# Patient Record
Sex: FEMALE | Race: BLACK OR AFRICAN AMERICAN | Employment: OTHER | URBAN - METROPOLITAN AREA
[De-identification: names, ages, dates, MRNs, and addresses within clinical notes are randomized per-mention and may not be internally consistent; named-entity substitution may affect disease eponyms.]

---

## 2017-03-28 ENCOUNTER — HOSPITAL ENCOUNTER (OUTPATIENT)
Dept: OTHER | Age: 82
Discharge: HOME | End: 2017-03-28

## 2017-03-28 LAB
ALBUMIN SERPL-MCNC: 3.3 GM/DL (ref 3.4–5)
ALP BLD-CCNC: 144 IU/L (ref 40–128)
ALT SERPL-CCNC: 25 U/L (ref 10–40)
ANION GAP SERPL CALCULATED.3IONS-SCNC: 7 MMOL/L (ref 4–16)
AST SERPL-CCNC: 22 IU/L (ref 15–37)
BACTERIA: ABNORMAL /HPF
BASOPHILS ABSOLUTE: 0 K/CU MM
BASOPHILS RELATIVE PERCENT: 0.6 % (ref 0–1)
BILIRUB SERPL-MCNC: 0.2 MG/DL (ref 0–1)
BILIRUBIN URINE: NEGATIVE MG/DL
BLOOD, URINE: NEGATIVE
BUN BLDV-MCNC: 36 MG/DL (ref 6–23)
CALCIUM SERPL-MCNC: 10.6 MG/DL (ref 8.3–10.6)
CHLORIDE BLD-SCNC: 102 MMOL/L (ref 99–110)
CHOLESTEROL: 167 MG/DL
CLARITY: ABNORMAL
CO2: 27 MMOL/L (ref 21–32)
COLOR: ABNORMAL
CREAT SERPL-MCNC: 1.3 MG/DL (ref 0.6–1.1)
CREATININE URINE: 53.4 MG/DL (ref 28–217)
DIFFERENTIAL TYPE: ABNORMAL
EOSINOPHILS ABSOLUTE: 0.2 K/CU MM
EOSINOPHILS RELATIVE PERCENT: 3.1 % (ref 0–3)
ESTIMATED AVERAGE GLUCOSE: 243 MG/DL
GFR AFRICAN AMERICAN: 48 ML/MIN/1.73M2
GFR NON-AFRICAN AMERICAN: 40 ML/MIN/1.73M2
GLUCOSE BLD-MCNC: 170 MG/DL (ref 70–140)
GLUCOSE, URINE: 150 MG/DL
HBA1C MFR BLD: 10.1 % (ref 4.2–6.3)
HCT VFR BLD CALC: 34.7 % (ref 37–47)
HDLC SERPL-MCNC: 89 MG/DL
HEMOGLOBIN: 10.7 GM/DL (ref 12.5–16)
IMMATURE NEUTROPHIL %: 0.2 % (ref 0–0.43)
KETONES, URINE: NEGATIVE MG/DL
LDL CHOLESTEROL DIRECT: 72 MG/DL
LEUKOCYTE ESTERASE, URINE: ABNORMAL
LYMPHOCYTES ABSOLUTE: 0.8 K/CU MM
LYMPHOCYTES RELATIVE PERCENT: 15 % (ref 24–44)
MCH RBC QN AUTO: 29.7 PG (ref 27–31)
MCHC RBC AUTO-ENTMCNC: 30.8 % (ref 32–36)
MCV RBC AUTO: 96.4 FL (ref 78–100)
MICROALBUMIN/CREAT 24H UR: 5.4 MG/DL
MICROALBUMIN/CREAT UR-RTO: 101.1 MG/G CREAT (ref 0–30)
MONOCYTES ABSOLUTE: 0.3 K/CU MM
MONOCYTES RELATIVE PERCENT: 5.7 % (ref 0–4)
MUCUS: ABNORMAL HPF
NITRITE URINE, QUANTITATIVE: NEGATIVE
NUCLEATED RBC %: 0 %
PDW BLD-RTO: 14.2 % (ref 11.7–14.9)
PH, URINE: 5 (ref 5–8)
PLATELET # BLD: 136 K/CU MM (ref 140–440)
PMV BLD AUTO: 11.6 FL (ref 7.5–11.1)
POTASSIUM SERPL-SCNC: 5.5 MMOL/L (ref 3.5–5.1)
PROTEIN UA: NEGATIVE MG/DL
RBC # BLD: 3.6 M/CU MM (ref 4.2–5.4)
RBC URINE: ABNORMAL /HPF (ref 0–6)
SEGMENTED NEUTROPHILS ABSOLUTE COUNT: 4.1 K/CU MM
SEGMENTED NEUTROPHILS RELATIVE PERCENT: 75.4 % (ref 36–66)
SODIUM BLD-SCNC: 136 MMOL/L (ref 135–145)
SPECIFIC GRAVITY UA: 1.01 (ref 1–1.03)
SQUAMOUS EPITHELIAL: 6 /HPF
TOTAL CK: 83 IU/L (ref 26–140)
TOTAL IMMATURE NEUTOROPHIL: 0.01 K/CU MM
TOTAL NUCLEATED RBC: 0 K/CU MM
TOTAL PROTEIN: 5.8 GM/DL (ref 6.4–8.2)
TRIGL SERPL-MCNC: 89 MG/DL
UROBILINOGEN, URINE: NORMAL EU/DL (ref 0.2–1)
WBC # BLD: 5.4 K/CU MM (ref 4–10.5)
WBC CLUMP: ABNORMAL /HPF
WBC UA: 79 /HPF (ref 0–5)

## 2017-03-30 LAB
CULTURE: NORMAL
ORGANISM: NORMAL
REPORT STATUS: NORMAL
REQUEST PROBLEM: NORMAL
SPECIMEN: NORMAL
TOTAL COLONY COUNT: NORMAL

## 2017-05-24 ENCOUNTER — HOSPITAL ENCOUNTER (OUTPATIENT)
Dept: OTHER | Age: 82
Discharge: HOME | End: 2017-05-24
Attending: INTERNAL MEDICINE | Admitting: INTERNAL MEDICINE

## 2017-05-24 LAB
ALBUMIN SERPL-MCNC: 3.9 GM/DL (ref 3.4–5)
ALP BLD-CCNC: 118 IU/L (ref 40–129)
ALT SERPL-CCNC: 14 U/L (ref 10–40)
ANION GAP SERPL CALCULATED.3IONS-SCNC: 12 MMOL/L (ref 4–16)
AST SERPL-CCNC: 20 IU/L (ref 15–37)
BILIRUB SERPL-MCNC: 0.2 MG/DL (ref 0–1)
BUN BLDV-MCNC: 35 MG/DL (ref 6–23)
CALCIUM SERPL-MCNC: 10.4 MG/DL (ref 8.3–10.6)
CHLORIDE BLD-SCNC: 109 MMOL/L (ref 99–110)
CO2: 22 MMOL/L (ref 21–32)
CREAT SERPL-MCNC: 1.3 MG/DL (ref 0.6–1.1)
GFR AFRICAN AMERICAN: 48 ML/MIN/1.73M2
GFR NON-AFRICAN AMERICAN: 40 ML/MIN/1.73M2
GLUCOSE BLD-MCNC: 70 MG/DL (ref 70–140)
LACTATE DEHYDROGENASE: 214 IU/L (ref 120–246)
POTASSIUM SERPL-SCNC: 5.9 MMOL/L (ref 3.5–5.1)
SODIUM BLD-SCNC: 143 MMOL/L (ref 135–145)
TOTAL PROTEIN: 6 GM/DL (ref 6.4–8.2)

## 2017-06-09 ENCOUNTER — HOSPITAL ENCOUNTER (OUTPATIENT)
Dept: OTHER | Age: 82
Discharge: HOME | End: 2017-06-09
Attending: INTERNAL MEDICINE | Admitting: INTERNAL MEDICINE

## 2017-06-09 LAB
ALBUMIN SERPL-MCNC: 4 GM/DL (ref 3.4–5)
ALP BLD-CCNC: 127 IU/L (ref 40–128)
ALT SERPL-CCNC: 17 U/L (ref 10–40)
ANION GAP SERPL CALCULATED.3IONS-SCNC: 11 MMOL/L (ref 4–16)
AST SERPL-CCNC: 19 IU/L (ref 15–37)
BANDED NEUTROPHILS ABSOLUTE COUNT: 0.24 K/CU MM
BANDED NEUTROPHILS RELATIVE PERCENT: 4 % (ref 5–11)
BILIRUB SERPL-MCNC: 0.2 MG/DL (ref 0–1)
BUN BLDV-MCNC: 41 MG/DL (ref 6–23)
CALCIUM SERPL-MCNC: 10.7 MG/DL (ref 8.3–10.6)
CHLORIDE BLD-SCNC: 107 MMOL/L (ref 99–110)
CHOLESTEROL: 166 MG/DL
CO2: 22 MMOL/L (ref 21–32)
CREAT SERPL-MCNC: 1.2 MG/DL (ref 0.6–1.1)
CREATININE URINE: 54.1 MG/DL (ref 28–217)
DIFFERENTIAL TYPE: ABNORMAL
EOSINOPHILS ABSOLUTE: 0.1 K/CU MM
EOSINOPHILS RELATIVE PERCENT: 2 % (ref 0–3)
ESTIMATED AVERAGE GLUCOSE: 180 MG/DL
GFR AFRICAN AMERICAN: 52 ML/MIN/1.73M2
GFR NON-AFRICAN AMERICAN: 43 ML/MIN/1.73M2
GLUCOSE BLD-MCNC: 165 MG/DL (ref 70–140)
HBA1C MFR BLD: 7.9 % (ref 4.2–6.3)
HCT VFR BLD CALC: 32.4 % (ref 37–47)
HDLC SERPL-MCNC: 104 MG/DL
HEMOGLOBIN: 10.4 GM/DL (ref 12.5–16)
LDL CHOLESTEROL DIRECT: 54 MG/DL
LYMPHOCYTES ABSOLUTE: 0.9 K/CU MM
LYMPHOCYTES RELATIVE PERCENT: 16 % (ref 24–44)
MCH RBC QN AUTO: 31 PG (ref 27–31)
MCHC RBC AUTO-ENTMCNC: 32.1 % (ref 32–36)
MCV RBC AUTO: 96.4 FL (ref 78–100)
MICROALBUMIN/CREAT 24H UR: 18.4 MG/DL
MICROALBUMIN/CREAT UR-RTO: 340.1 MG/G CREAT (ref 0–30)
MONOCYTES ABSOLUTE: 0.4 K/CU MM
MONOCYTES RELATIVE PERCENT: 6 % (ref 0–4)
PDW BLD-RTO: 15 % (ref 11.7–14.9)
PLATELET # BLD: 164 K/CU MM (ref 140–440)
PMV BLD AUTO: 11.1 FL (ref 7.5–11.1)
POTASSIUM SERPL-SCNC: 5.2 MMOL/L (ref 3.5–5.1)
RBC # BLD: 3.36 M/CU MM (ref 4.2–5.4)
RBC # BLD: SLIGHT 10*6/UL
SEGMENTED NEUTROPHILS ABSOLUTE COUNT: 4.3 K/CU MM
SEGMENTED NEUTROPHILS RELATIVE PERCENT: 72 % (ref 36–66)
SODIUM BLD-SCNC: 140 MMOL/L (ref 135–145)
TOTAL CK: 100 IU/L (ref 26–140)
TOTAL PROTEIN: 6.7 GM/DL (ref 6.4–8.2)
TRIGL SERPL-MCNC: 67 MG/DL
URIC ACID: 3.6 MG/DL (ref 2.6–6)
WBC # BLD: 5.9 K/CU MM (ref 4–10.5)

## 2018-01-01 ENCOUNTER — HOSPITAL ENCOUNTER (OUTPATIENT)
Age: 83
Setting detail: SPECIMEN
Discharge: HOME | End: 2018-09-28

## 2018-01-01 ENCOUNTER — APPOINTMENT (OUTPATIENT)
Dept: CT IMAGING | Age: 83
DRG: 689 | End: 2018-01-01
Payer: COMMERCIAL

## 2018-01-01 ENCOUNTER — HOSPITAL ENCOUNTER (OUTPATIENT)
Dept: GENERAL RADIOLOGY | Age: 83
Discharge: HOME | End: 2018-05-18

## 2018-01-01 ENCOUNTER — APPOINTMENT (OUTPATIENT)
Dept: GENERAL RADIOLOGY | Age: 83
DRG: 689 | End: 2018-01-01
Payer: COMMERCIAL

## 2018-01-01 ENCOUNTER — HOSPITAL ENCOUNTER (OUTPATIENT)
Dept: OTHER | Age: 83
Discharge: HOME | End: 2018-09-04
Attending: INTERNAL MEDICINE | Admitting: INTERNAL MEDICINE

## 2018-01-01 ENCOUNTER — HOSPITAL ENCOUNTER (OUTPATIENT)
Age: 83
Discharge: HOME | End: 2018-09-25

## 2018-01-01 ENCOUNTER — HOSPITAL ENCOUNTER (OUTPATIENT)
Age: 83
Setting detail: SPECIMEN
Discharge: HOME | End: 2018-11-28
Payer: COMMERCIAL

## 2018-01-01 ENCOUNTER — HOSPITAL ENCOUNTER (INPATIENT)
Age: 83
LOS: 3 days | Discharge: HOME HEALTH | DRG: 689 | End: 2018-10-18
Attending: EMERGENCY MEDICINE | Admitting: INTERNAL MEDICINE
Payer: COMMERCIAL

## 2018-01-01 VITALS
WEIGHT: 128.5 LBS | TEMPERATURE: 97.9 F | DIASTOLIC BLOOD PRESSURE: 63 MMHG | HEIGHT: 63 IN | RESPIRATION RATE: 14 BRPM | HEART RATE: 81 BPM | BODY MASS INDEX: 22.77 KG/M2 | OXYGEN SATURATION: 100 % | SYSTOLIC BLOOD PRESSURE: 112 MMHG

## 2018-01-01 DIAGNOSIS — E87.5 HYPERKALEMIA: ICD-10-CM

## 2018-01-01 DIAGNOSIS — M79.672 PAIN IN LEFT FOOT: ICD-10-CM

## 2018-01-01 DIAGNOSIS — R41.82 ALTERED MENTAL STATUS, UNSPECIFIED ALTERED MENTAL STATUS TYPE: Primary | ICD-10-CM

## 2018-01-01 LAB
ALBUMIN SERPL-MCNC: 3.7 GM/DL (ref 3.4–5)
ALP BLD-CCNC: 145 IU/L (ref 40–129)
ALT SERPL-CCNC: 18 U/L (ref 10–40)
ANION GAP SERPL CALCULATED.3IONS-SCNC: 10 MMOL/L (ref 4–16)
ANION GAP SERPL CALCULATED.3IONS-SCNC: 13 MMOL/L (ref 4–16)
ANION GAP SERPL CALCULATED.3IONS-SCNC: 14 MMOL/L (ref 4–16)
ANION GAP SERPL CALCULATED.3IONS-SCNC: 6 MMOL/L (ref 4–16)
ANION GAP SERPL CALCULATED.3IONS-SCNC: 6 MMOL/L (ref 4–16)
ANION GAP SERPL CALCULATED.3IONS-SCNC: 7 MMOL/L (ref 4–16)
ANION GAP SERPL CALCULATED.3IONS-SCNC: 8 MMOL/L (ref 4–16)
ANION GAP SERPL CALCULATED.3IONS-SCNC: 8 MMOL/L (ref 4–16)
ANION GAP SERPL CALCULATED.3IONS-SCNC: 9 MMOL/L (ref 4–16)
ANION GAP SERPL CALCULATED.3IONS-SCNC: 9 MMOL/L (ref 4–16)
APTT: 26.7 SECONDS (ref 21.2–33)
AST SERPL-CCNC: 23 IU/L (ref 15–37)
BACTERIA: ABNORMAL /HPF
BACTERIA: NEGATIVE /HPF
BASOPHILS ABSOLUTE: 0 K/CU MM
BASOPHILS ABSOLUTE: 0.1 K/CU MM
BASOPHILS RELATIVE PERCENT: 0.4 % (ref 0–1)
BASOPHILS RELATIVE PERCENT: 0.6 % (ref 0–1)
BILIRUB SERPL-MCNC: 0.2 MG/DL (ref 0–1)
BILIRUBIN URINE: NEGATIVE MG/DL
BILIRUBIN URINE: NEGATIVE MG/DL
BLOOD, URINE: NEGATIVE
BLOOD, URINE: NEGATIVE
BUN BLDV-MCNC: 27 MG/DL (ref 6–23)
BUN BLDV-MCNC: 29 MG/DL (ref 6–23)
BUN BLDV-MCNC: 29 MG/DL (ref 6–23)
BUN BLDV-MCNC: 31 MG/DL (ref 6–23)
BUN BLDV-MCNC: 32 MG/DL (ref 6–23)
BUN BLDV-MCNC: 33 MG/DL (ref 6–23)
BUN BLDV-MCNC: 34 MG/DL (ref 6–23)
BUN BLDV-MCNC: 36 MG/DL (ref 6–23)
BUN BLDV-MCNC: 38 MG/DL (ref 6–23)
BUN BLDV-MCNC: 40 MG/DL (ref 6–23)
CALCIUM SERPL-MCNC: 10 MG/DL (ref 8.3–10.6)
CALCIUM SERPL-MCNC: 10.3 MG/DL (ref 8.3–10.6)
CALCIUM SERPL-MCNC: 10.5 MG/DL (ref 8.3–10.6)
CALCIUM SERPL-MCNC: 10.6 MG/DL (ref 8.3–10.6)
CALCIUM SERPL-MCNC: 9.3 MG/DL (ref 8.3–10.6)
CALCIUM SERPL-MCNC: 9.3 MG/DL (ref 8.3–10.6)
CALCIUM SERPL-MCNC: 9.6 MG/DL (ref 8.3–10.6)
CALCIUM SERPL-MCNC: 9.7 MG/DL (ref 8.3–10.6)
CALCIUM SERPL-MCNC: 9.7 MG/DL (ref 8.3–10.6)
CHLORIDE BLD-SCNC: 106 MMOL/L (ref 99–110)
CHLORIDE BLD-SCNC: 107 MMOL/L (ref 99–110)
CHLORIDE BLD-SCNC: 108 MMOL/L (ref 99–110)
CHLORIDE BLD-SCNC: 109 MMOL/L (ref 99–110)
CHLORIDE BLD-SCNC: 109 MMOL/L (ref 99–110)
CHLORIDE BLD-SCNC: 110 MMOL/L (ref 99–110)
CHLORIDE BLD-SCNC: 110 MMOL/L (ref 99–110)
CHLORIDE BLD-SCNC: 113 MMOL/L (ref 99–110)
CHLORIDE BLD-SCNC: 115 MMOL/L (ref 99–110)
CHLORIDE BLD-SCNC: 116 MMOL/L (ref 99–110)
CLARITY: ABNORMAL
CLARITY: CLEAR
CO2: 18 MMOL/L (ref 21–32)
CO2: 20 MMOL/L (ref 21–32)
CO2: 21 MMOL/L (ref 21–32)
CO2: 21 MMOL/L (ref 21–32)
CO2: 22 MMOL/L (ref 21–32)
CO2: 22 MMOL/L (ref 21–32)
CO2: 23 MMOL/L (ref 21–32)
CO2: 23 MMOL/L (ref 21–32)
CO2: 24 MMOL/L (ref 21–32)
CO2: 24 MMOL/L (ref 21–32)
COLOR: YELLOW
COLOR: YELLOW
CREAT SERPL-MCNC: 1.4 MG/DL (ref 0.6–1.1)
CREAT SERPL-MCNC: 1.5 MG/DL (ref 0.6–1.1)
CREAT SERPL-MCNC: 1.5 MG/DL (ref 0.6–1.1)
CREAT SERPL-MCNC: 1.6 MG/DL (ref 0.6–1.1)
CREAT SERPL-MCNC: 1.6 MG/DL (ref 0.6–1.1)
CREAT SERPL-MCNC: 1.7 MG/DL (ref 0.6–1.1)
CREAT SERPL-MCNC: 1.7 MG/DL (ref 0.6–1.1)
CREAT SERPL-MCNC: 1.8 MG/DL (ref 0.6–1.1)
CREATININE URINE: 55.3 MG/DL (ref 28–217)
CULTURE: NORMAL
DIFFERENTIAL TYPE: ABNORMAL
DIFFERENTIAL TYPE: ABNORMAL
EKG ATRIAL RATE: 87 BPM
EKG DIAGNOSIS: NORMAL
EKG P AXIS: 44 DEGREES
EKG P-R INTERVAL: 178 MS
EKG Q-T INTERVAL: 392 MS
EKG QRS DURATION: 134 MS
EKG QTC CALCULATION (BAZETT): 471 MS
EKG R AXIS: -64 DEGREES
EKG T AXIS: 44 DEGREES
EKG VENTRICULAR RATE: 87 BPM
EOSINOPHILS ABSOLUTE: 0 K/CU MM
EOSINOPHILS ABSOLUTE: 0.1 K/CU MM
EOSINOPHILS RELATIVE PERCENT: 0.1 % (ref 0–3)
EOSINOPHILS RELATIVE PERCENT: 1.7 % (ref 0–3)
ESTIMATED AVERAGE GLUCOSE: 166 MG/DL
GFR AFRICAN AMERICAN: 33 ML/MIN/1.73M2
GFR AFRICAN AMERICAN: 35 ML/MIN/1.73M2
GFR AFRICAN AMERICAN: 35 ML/MIN/1.73M2
GFR AFRICAN AMERICAN: 38 ML/MIN/1.73M2
GFR AFRICAN AMERICAN: 38 ML/MIN/1.73M2
GFR AFRICAN AMERICAN: 40 ML/MIN/1.73M2
GFR AFRICAN AMERICAN: 40 ML/MIN/1.73M2
GFR AFRICAN AMERICAN: 44 ML/MIN/1.73M2
GFR NON-AFRICAN AMERICAN: 27 ML/MIN/1.73M2
GFR NON-AFRICAN AMERICAN: 29 ML/MIN/1.73M2
GFR NON-AFRICAN AMERICAN: 29 ML/MIN/1.73M2
GFR NON-AFRICAN AMERICAN: 31 ML/MIN/1.73M2
GFR NON-AFRICAN AMERICAN: 31 ML/MIN/1.73M2
GFR NON-AFRICAN AMERICAN: 33 ML/MIN/1.73M2
GFR NON-AFRICAN AMERICAN: 33 ML/MIN/1.73M2
GFR NON-AFRICAN AMERICAN: 36 ML/MIN/1.73M2
GLUCOSE BLD-MCNC: 113 MG/DL (ref 70–99)
GLUCOSE BLD-MCNC: 117 MG/DL (ref 70–99)
GLUCOSE BLD-MCNC: 119 MG/DL (ref 70–99)
GLUCOSE BLD-MCNC: 127 MG/DL
GLUCOSE BLD-MCNC: 127 MG/DL (ref 70–99)
GLUCOSE BLD-MCNC: 130 MG/DL (ref 70–99)
GLUCOSE BLD-MCNC: 134 MG/DL (ref 70–99)
GLUCOSE BLD-MCNC: 148 MG/DL (ref 70–99)
GLUCOSE BLD-MCNC: 152 MG/DL (ref 70–99)
GLUCOSE BLD-MCNC: 154 MG/DL (ref 70–99)
GLUCOSE BLD-MCNC: 159 MG/DL (ref 70–99)
GLUCOSE BLD-MCNC: 178 MG/DL (ref 70–99)
GLUCOSE BLD-MCNC: 180 MG/DL (ref 70–99)
GLUCOSE BLD-MCNC: 189 MG/DL (ref 70–99)
GLUCOSE BLD-MCNC: 198 MG/DL (ref 70–99)
GLUCOSE BLD-MCNC: 215 MG/DL (ref 70–99)
GLUCOSE BLD-MCNC: 219 MG/DL (ref 70–99)
GLUCOSE BLD-MCNC: 224 MG/DL (ref 70–99)
GLUCOSE BLD-MCNC: 225 MG/DL (ref 70–99)
GLUCOSE BLD-MCNC: 233 MG/DL (ref 70–99)
GLUCOSE BLD-MCNC: 238 MG/DL (ref 70–99)
GLUCOSE BLD-MCNC: 277 MG/DL (ref 70–99)
GLUCOSE BLD-MCNC: 289 MG/DL (ref 70–99)
GLUCOSE BLD-MCNC: 325 MG/DL (ref 70–99)
GLUCOSE BLD-MCNC: 90 MG/DL (ref 70–99)
GLUCOSE, URINE: 50 MG/DL
GLUCOSE, URINE: 50 MG/DL
HBA1C MFR BLD: 7.4 % (ref 4.2–6.3)
HCT VFR BLD CALC: 31.8 % (ref 37–47)
HCT VFR BLD CALC: 31.9 % (ref 37–47)
HCT VFR BLD CALC: 32.9 % (ref 37–47)
HCT VFR BLD CALC: 33.9 % (ref 37–47)
HCT VFR BLD CALC: 35.1 % (ref 37–47)
HCT VFR BLD CALC: 37 % (ref 37–47)
HEMOGLOBIN: 10.2 GM/DL (ref 12.5–16)
HEMOGLOBIN: 10.8 GM/DL (ref 12.5–16)
HEMOGLOBIN: 10.8 GM/DL (ref 12.5–16)
HEMOGLOBIN: 11 GM/DL (ref 12.5–16)
HEMOGLOBIN: 9.5 GM/DL (ref 12.5–16)
HEMOGLOBIN: 9.5 GM/DL (ref 12.5–16)
HYALINE CASTS: 2 /LPF
IMMATURE NEUTROPHIL %: 0.2 % (ref 0–0.43)
IMMATURE NEUTROPHIL %: 0.2 % (ref 0–0.43)
INR BLD: 0.88 INDEX
KETONES, URINE: NEGATIVE MG/DL
KETONES, URINE: NEGATIVE MG/DL
LEUKOCYTE ESTERASE, URINE: ABNORMAL
LEUKOCYTE ESTERASE, URINE: ABNORMAL
LYMPHOCYTES ABSOLUTE: 0.7 K/CU MM
LYMPHOCYTES ABSOLUTE: 1.1 K/CU MM
LYMPHOCYTES RELATIVE PERCENT: 13.3 % (ref 24–44)
LYMPHOCYTES RELATIVE PERCENT: 8.4 % (ref 24–44)
Lab: NORMAL
MCH RBC QN AUTO: 29.1 PG (ref 27–31)
MCH RBC QN AUTO: 29.5 PG (ref 27–31)
MCH RBC QN AUTO: 29.6 PG (ref 27–31)
MCH RBC QN AUTO: 29.8 PG (ref 27–31)
MCH RBC QN AUTO: 29.9 PG (ref 27–31)
MCH RBC QN AUTO: 30.3 PG (ref 27–31)
MCHC RBC AUTO-ENTMCNC: 29.7 % (ref 32–36)
MCHC RBC AUTO-ENTMCNC: 29.8 % (ref 32–36)
MCHC RBC AUTO-ENTMCNC: 29.9 % (ref 32–36)
MCHC RBC AUTO-ENTMCNC: 30.8 % (ref 32–36)
MCHC RBC AUTO-ENTMCNC: 31 % (ref 32–36)
MCHC RBC AUTO-ENTMCNC: 31.9 % (ref 32–36)
MCV RBC AUTO: 100.3 FL (ref 78–100)
MCV RBC AUTO: 95 FL (ref 78–100)
MCV RBC AUTO: 95.4 FL (ref 78–100)
MCV RBC AUTO: 97.2 FL (ref 78–100)
MCV RBC AUTO: 97.6 FL (ref 78–100)
MCV RBC AUTO: 98.8 FL (ref 78–100)
MONOCYTES ABSOLUTE: 0.3 K/CU MM
MONOCYTES ABSOLUTE: 0.5 K/CU MM
MONOCYTES RELATIVE PERCENT: 3.5 % (ref 0–4)
MONOCYTES RELATIVE PERCENT: 6.7 % (ref 0–4)
MUCUS: ABNORMAL HPF
NITRITE URINE, QUANTITATIVE: NEGATIVE
NITRITE URINE, QUANTITATIVE: POSITIVE
NUCLEATED RBC %: 0 %
NUCLEATED RBC %: 0 %
ORGANISM: NORMAL
PDW BLD-RTO: 13.6 % (ref 11.7–14.9)
PDW BLD-RTO: 13.7 % (ref 11.7–14.9)
PDW BLD-RTO: 13.7 % (ref 11.7–14.9)
PDW BLD-RTO: 13.8 % (ref 11.7–14.9)
PDW BLD-RTO: 14.3 % (ref 11.7–14.9)
PDW BLD-RTO: 14.4 % (ref 11.7–14.9)
PH, URINE: 5 (ref 5–8)
PH, URINE: 6 (ref 5–8)
PHOSPHORUS: 3 MG/DL (ref 2.5–4.9)
PLATELET # BLD: 177 K/CU MM (ref 140–440)
PLATELET # BLD: 188 K/CU MM (ref 140–440)
PLATELET # BLD: 195 K/CU MM (ref 140–440)
PLATELET # BLD: 202 K/CU MM (ref 140–440)
PLATELET # BLD: 224 K/CU MM (ref 140–440)
PLATELET # BLD: 234 K/CU MM (ref 140–440)
PMV BLD AUTO: 10.2 FL (ref 7.5–11.1)
PMV BLD AUTO: 10.3 FL (ref 7.5–11.1)
PMV BLD AUTO: 10.4 FL (ref 7.5–11.1)
PMV BLD AUTO: 10.6 FL (ref 7.5–11.1)
PMV BLD AUTO: 10.7 FL (ref 7.5–11.1)
PMV BLD AUTO: 10.9 FL (ref 7.5–11.1)
POTASSIUM SERPL-SCNC: 4.5 MMOL/L (ref 3.5–5.1)
POTASSIUM SERPL-SCNC: 4.6 MMOL/L (ref 3.5–5.1)
POTASSIUM SERPL-SCNC: 5.2 MMOL/L (ref 3.5–5.1)
POTASSIUM SERPL-SCNC: 5.3 MMOL/L (ref 3.5–5.1)
POTASSIUM SERPL-SCNC: 5.4 MMOL/L (ref 3.5–5.1)
POTASSIUM SERPL-SCNC: 5.5 MMOL/L (ref 3.5–5.1)
POTASSIUM SERPL-SCNC: 5.9 MMOL/L (ref 3.5–5.1)
POTASSIUM SERPL-SCNC: 6.2 MMOL/L (ref 3.5–5.1)
POTASSIUM SERPL-SCNC: 6.6 MMOL/L (ref 3.5–5.1)
PROT/CREAT RATIO, UR: 3.3
PROTEIN UA: 100 MG/DL
PROTEIN UA: 100 MG/DL
PROTHROMBIN TIME: 10.1 SECONDS (ref 9.12–12.5)
RBC # BLD: 3.22 M/CU MM (ref 4.2–5.4)
RBC # BLD: 3.27 M/CU MM (ref 4.2–5.4)
RBC # BLD: 3.45 M/CU MM (ref 4.2–5.4)
RBC # BLD: 3.57 M/CU MM (ref 4.2–5.4)
RBC # BLD: 3.61 M/CU MM (ref 4.2–5.4)
RBC # BLD: 3.69 M/CU MM (ref 4.2–5.4)
RBC URINE: 1 /HPF (ref 0–6)
RBC URINE: <1 /HPF (ref 0–6)
REPORT STATUS: NORMAL
SEGMENTED NEUTROPHILS ABSOLUTE COUNT: 6.3 K/CU MM
SEGMENTED NEUTROPHILS ABSOLUTE COUNT: 7 K/CU MM
SEGMENTED NEUTROPHILS RELATIVE PERCENT: 77.5 % (ref 36–66)
SEGMENTED NEUTROPHILS RELATIVE PERCENT: 87.4 % (ref 36–66)
SODIUM BLD-SCNC: 138 MMOL/L (ref 135–145)
SODIUM BLD-SCNC: 139 MMOL/L (ref 135–145)
SODIUM BLD-SCNC: 139 MMOL/L (ref 135–145)
SODIUM BLD-SCNC: 140 MMOL/L (ref 135–145)
SODIUM BLD-SCNC: 141 MMOL/L (ref 135–145)
SODIUM BLD-SCNC: 141 MMOL/L (ref 135–145)
SODIUM BLD-SCNC: 142 MMOL/L (ref 135–145)
SODIUM BLD-SCNC: 142 MMOL/L (ref 135–145)
SODIUM BLD-SCNC: 143 MMOL/L (ref 135–145)
SODIUM BLD-SCNC: 143 MMOL/L (ref 135–145)
SODIUM BLD-SCNC: 144 MMOL/L (ref 135–145)
SODIUM BLD-SCNC: 145 MMOL/L (ref 135–145)
SPECIFIC GRAVITY UA: 1.01 (ref 1–1.03)
SPECIFIC GRAVITY UA: 1.01 (ref 1–1.03)
SPECIMEN: NORMAL
SQUAMOUS EPITHELIAL: <1 /HPF
TOTAL COLONY COUNT: NORMAL
TOTAL IMMATURE NEUTOROPHIL: 0.02 K/CU MM
TOTAL IMMATURE NEUTOROPHIL: 0.02 K/CU MM
TOTAL NUCLEATED RBC: 0 K/CU MM
TOTAL NUCLEATED RBC: 0 K/CU MM
TOTAL PROTEIN: 6.4 GM/DL (ref 6.4–8.2)
TRANSITIONAL EPITHELIAL: <1 /HPF
TRICHOMONAS: ABNORMAL /HPF
TRICHOMONAS: ABNORMAL /HPF
TROPONIN T: 0.04 NG/ML
URINE TOTAL PROTEIN: 182.9 MG/DL
UROBILINOGEN, URINE: NORMAL MG/DL (ref 0.2–1)
UROBILINOGEN, URINE: NORMAL MG/DL (ref 0.2–1)
WBC # BLD: 4.7 K/CU MM (ref 4–10.5)
WBC # BLD: 5.8 K/CU MM (ref 4–10.5)
WBC # BLD: 6 K/CU MM (ref 4–10.5)
WBC # BLD: 7.3 K/CU MM (ref 4–10.5)
WBC # BLD: 8 K/CU MM (ref 4–10.5)
WBC # BLD: 8.1 K/CU MM (ref 4–10.5)
WBC UA: 6 /HPF (ref 0–5)
WBC UA: 7 /HPF (ref 0–5)

## 2018-01-01 PROCEDURE — 51702 INSERT TEMP BLADDER CATH: CPT

## 2018-01-01 PROCEDURE — 80048 BASIC METABOLIC PNL TOTAL CA: CPT

## 2018-01-01 PROCEDURE — 36415 COLL VENOUS BLD VENIPUNCTURE: CPT

## 2018-01-01 PROCEDURE — 85027 COMPLETE CBC AUTOMATED: CPT

## 2018-01-01 PROCEDURE — 2580000003 HC RX 258: Performed by: INTERNAL MEDICINE

## 2018-01-01 PROCEDURE — 6370000000 HC RX 637 (ALT 250 FOR IP): Performed by: INTERNAL MEDICINE

## 2018-01-01 PROCEDURE — 84156 ASSAY OF PROTEIN URINE: CPT

## 2018-01-01 PROCEDURE — 1200000000 HC SEMI PRIVATE

## 2018-01-01 PROCEDURE — 97162 PT EVAL MOD COMPLEX 30 MIN: CPT

## 2018-01-01 PROCEDURE — 85610 PROTHROMBIN TIME: CPT

## 2018-01-01 PROCEDURE — 87086 URINE CULTURE/COLONY COUNT: CPT

## 2018-01-01 PROCEDURE — G8979 MOBILITY GOAL STATUS: HCPCS

## 2018-01-01 PROCEDURE — 70450 CT HEAD/BRAIN W/O DYE: CPT

## 2018-01-01 PROCEDURE — 85025 COMPLETE CBC W/AUTO DIFF WBC: CPT

## 2018-01-01 PROCEDURE — 99285 EMERGENCY DEPT VISIT HI MDM: CPT

## 2018-01-01 PROCEDURE — 81001 URINALYSIS AUTO W/SCOPE: CPT

## 2018-01-01 PROCEDURE — 85730 THROMBOPLASTIN TIME PARTIAL: CPT

## 2018-01-01 PROCEDURE — 84484 ASSAY OF TROPONIN QUANT: CPT

## 2018-01-01 PROCEDURE — 70496 CT ANGIOGRAPHY HEAD: CPT

## 2018-01-01 PROCEDURE — 2700000000 HC OXYGEN THERAPY PER DAY

## 2018-01-01 PROCEDURE — 82962 GLUCOSE BLOOD TEST: CPT

## 2018-01-01 PROCEDURE — 6360000002 HC RX W HCPCS: Performed by: INTERNAL MEDICINE

## 2018-01-01 PROCEDURE — 87186 SC STD MICRODIL/AGAR DIL: CPT

## 2018-01-01 PROCEDURE — 2580000003 HC RX 258: Performed by: PHYSICIAN ASSISTANT

## 2018-01-01 PROCEDURE — G8978 MOBILITY CURRENT STATUS: HCPCS

## 2018-01-01 PROCEDURE — 71045 X-RAY EXAM CHEST 1 VIEW: CPT

## 2018-01-01 PROCEDURE — 97112 NEUROMUSCULAR REEDUCATION: CPT

## 2018-01-01 PROCEDURE — 70498 CT ANGIOGRAPHY NECK: CPT

## 2018-01-01 PROCEDURE — 6370000000 HC RX 637 (ALT 250 FOR IP): Performed by: EMERGENCY MEDICINE

## 2018-01-01 PROCEDURE — 84100 ASSAY OF PHOSPHORUS: CPT

## 2018-01-01 PROCEDURE — 6360000004 HC RX CONTRAST MEDICATION: Performed by: PHYSICIAN ASSISTANT

## 2018-01-01 PROCEDURE — 82570 ASSAY OF URINE CREATININE: CPT

## 2018-01-01 PROCEDURE — 83036 HEMOGLOBIN GLYCOSYLATED A1C: CPT

## 2018-01-01 PROCEDURE — 87088 URINE BACTERIA CULTURE: CPT

## 2018-01-01 PROCEDURE — 80053 COMPREHEN METABOLIC PANEL: CPT

## 2018-01-01 PROCEDURE — 93010 ELECTROCARDIOGRAM REPORT: CPT | Performed by: INTERNAL MEDICINE

## 2018-01-01 PROCEDURE — 93005 ELECTROCARDIOGRAM TRACING: CPT | Performed by: PHYSICIAN ASSISTANT

## 2018-01-01 PROCEDURE — 94761 N-INVAS EAR/PLS OXIMETRY MLT: CPT

## 2018-01-01 RX ORDER — CLOPIDOGREL BISULFATE 75 MG/1
75 TABLET ORAL DAILY
Status: DISCONTINUED | OUTPATIENT
Start: 2018-01-01 | End: 2018-01-01 | Stop reason: HOSPADM

## 2018-01-01 RX ORDER — DEXTROSE MONOHYDRATE 25 G/50ML
12.5 INJECTION, SOLUTION INTRAVENOUS PRN
Status: DISCONTINUED | OUTPATIENT
Start: 2018-01-01 | End: 2018-01-01 | Stop reason: HOSPADM

## 2018-01-01 RX ORDER — SODIUM BICARBONATE 650 MG/1
650 TABLET ORAL 2 TIMES DAILY
Status: DISCONTINUED | OUTPATIENT
Start: 2018-01-01 | End: 2018-01-01 | Stop reason: HOSPADM

## 2018-01-01 RX ORDER — SODIUM CHLORIDE 0.9 % (FLUSH) 0.9 %
10 SYRINGE (ML) INJECTION PRN
Status: DISCONTINUED | OUTPATIENT
Start: 2018-01-01 | End: 2018-01-01 | Stop reason: HOSPADM

## 2018-01-01 RX ORDER — SODIUM BICARBONATE 650 MG/1
650 TABLET ORAL DAILY
Status: DISCONTINUED | OUTPATIENT
Start: 2018-01-01 | End: 2018-01-01

## 2018-01-01 RX ORDER — SODIUM CHLORIDE 0.9 % (FLUSH) 0.9 %
10 SYRINGE (ML) INJECTION EVERY 12 HOURS SCHEDULED
Status: DISCONTINUED | OUTPATIENT
Start: 2018-01-01 | End: 2018-01-01 | Stop reason: HOSPADM

## 2018-01-01 RX ORDER — ONDANSETRON 2 MG/ML
4 INJECTION INTRAMUSCULAR; INTRAVENOUS EVERY 6 HOURS PRN
Status: CANCELLED | OUTPATIENT
Start: 2018-01-01

## 2018-01-01 RX ORDER — CIPROFLOXACIN 250 MG/1
250 TABLET, FILM COATED ORAL 2 TIMES DAILY
Qty: 20 TABLET | Refills: 0 | Status: SHIPPED | OUTPATIENT
Start: 2018-01-01 | End: 2018-01-01

## 2018-01-01 RX ORDER — B12/LEVOMEFOLATE CALCIUM/B-6 2-1.13-25
1 TABLET ORAL DAILY
Status: DISCONTINUED | OUTPATIENT
Start: 2018-01-01 | End: 2018-01-01 | Stop reason: HOSPADM

## 2018-01-01 RX ORDER — DEXTROSE MONOHYDRATE 50 MG/ML
100 INJECTION, SOLUTION INTRAVENOUS PRN
Status: DISCONTINUED | OUTPATIENT
Start: 2018-01-01 | End: 2018-01-01 | Stop reason: HOSPADM

## 2018-01-01 RX ORDER — LOSARTAN POTASSIUM 50 MG/1
50 TABLET ORAL DAILY
Refills: 6 | COMMUNITY
Start: 2018-01-01 | End: 2018-01-01 | Stop reason: ALTCHOICE

## 2018-01-01 RX ORDER — FUROSEMIDE 10 MG/ML
40 INJECTION INTRAMUSCULAR; INTRAVENOUS ONCE
Status: CANCELLED | OUTPATIENT
Start: 2018-01-01 | End: 2018-01-01

## 2018-01-01 RX ORDER — TAMOXIFEN CITRATE 10 MG/1
10 TABLET ORAL DAILY
Status: DISCONTINUED | OUTPATIENT
Start: 2018-01-01 | End: 2018-01-01 | Stop reason: HOSPADM

## 2018-01-01 RX ORDER — ATORVASTATIN CALCIUM 40 MG/1
40 TABLET, FILM COATED ORAL NIGHTLY
Status: CANCELLED | OUTPATIENT
Start: 2018-01-01

## 2018-01-01 RX ORDER — 0.9 % SODIUM CHLORIDE 0.9 %
500 INTRAVENOUS SOLUTION INTRAVENOUS ONCE
Status: CANCELLED | OUTPATIENT
Start: 2018-01-01 | End: 2018-01-01

## 2018-01-01 RX ORDER — ASPIRIN 81 MG/1
81 TABLET, CHEWABLE ORAL DAILY
Status: CANCELLED | OUTPATIENT
Start: 2018-01-01

## 2018-01-01 RX ORDER — SODIUM CHLORIDE 0.9 % (FLUSH) 0.9 %
10 SYRINGE (ML) INJECTION PRN
Status: DISCONTINUED | OUTPATIENT
Start: 2018-01-01 | End: 2018-01-01

## 2018-01-01 RX ORDER — SODIUM CHLORIDE 0.9 % (FLUSH) 0.9 %
10 SYRINGE (ML) INJECTION EVERY 12 HOURS SCHEDULED
Status: CANCELLED | OUTPATIENT
Start: 2018-01-01

## 2018-01-01 RX ORDER — ASPIRIN 81 MG/1
81 TABLET, CHEWABLE ORAL DAILY
Status: DISCONTINUED | OUTPATIENT
Start: 2018-01-01 | End: 2018-01-01 | Stop reason: HOSPADM

## 2018-01-01 RX ORDER — NICOTINE POLACRILEX 4 MG
15 LOZENGE BUCCAL PRN
Status: DISCONTINUED | OUTPATIENT
Start: 2018-01-01 | End: 2018-01-01 | Stop reason: HOSPADM

## 2018-01-01 RX ORDER — ANASTROZOLE 1 MG/1
1 TABLET ORAL DAILY
Refills: 3 | COMMUNITY
Start: 2018-01-01 | End: 2019-01-01

## 2018-01-01 RX ORDER — SULFAMETHOXAZOLE AND TRIMETHOPRIM 400; 80 MG/1; MG/1
1 TABLET ORAL 2 TIMES DAILY
Status: DISCONTINUED | OUTPATIENT
Start: 2018-01-01 | End: 2018-01-01 | Stop reason: HOSPADM

## 2018-01-01 RX ORDER — SODIUM BICARBONATE 650 MG/1
650 TABLET ORAL 2 TIMES DAILY
Qty: 60 TABLET | Refills: 11 | Status: SHIPPED | OUTPATIENT
Start: 2018-01-01

## 2018-01-01 RX ORDER — SODIUM POLYSTYRENE SULFONATE 15 G/60ML
15 SUSPENSION ORAL; RECTAL ONCE
Status: CANCELLED | OUTPATIENT
Start: 2018-01-01 | End: 2018-01-01

## 2018-01-01 RX ORDER — SODIUM CHLORIDE 9 MG/ML
INJECTION, SOLUTION INTRAVENOUS CONTINUOUS
Status: DISCONTINUED | OUTPATIENT
Start: 2018-01-01 | End: 2018-01-01

## 2018-01-01 RX ORDER — ASPIRIN 81 MG/1
324 TABLET, CHEWABLE ORAL ONCE
Status: COMPLETED | OUTPATIENT
Start: 2018-01-01 | End: 2018-01-01

## 2018-01-01 RX ORDER — CLOPIDOGREL BISULFATE 75 MG/1
75 TABLET ORAL DAILY
Status: CANCELLED | OUTPATIENT
Start: 2018-01-01

## 2018-01-01 RX ORDER — ONDANSETRON 2 MG/ML
4 INJECTION INTRAMUSCULAR; INTRAVENOUS EVERY 6 HOURS PRN
Status: DISCONTINUED | OUTPATIENT
Start: 2018-01-01 | End: 2018-01-01 | Stop reason: HOSPADM

## 2018-01-01 RX ORDER — SODIUM CHLORIDE 0.9 % (FLUSH) 0.9 %
10 SYRINGE (ML) INJECTION PRN
Status: CANCELLED | OUTPATIENT
Start: 2018-01-01

## 2018-01-01 RX ORDER — ACETAMINOPHEN 500 MG
1000 TABLET ORAL ONCE
Status: COMPLETED | OUTPATIENT
Start: 2018-01-01 | End: 2018-01-01

## 2018-01-01 RX ORDER — HEPARIN SODIUM 5000 [USP'U]/ML
5000 INJECTION, SOLUTION INTRAVENOUS; SUBCUTANEOUS EVERY 8 HOURS SCHEDULED
Status: CANCELLED | OUTPATIENT
Start: 2018-01-01

## 2018-01-01 RX ORDER — DOCUSATE SODIUM 100 MG/1
100 CAPSULE, LIQUID FILLED ORAL EVERY OTHER DAY
Refills: 2 | COMMUNITY
Start: 2018-01-01 | End: 2019-01-01

## 2018-01-01 RX ORDER — SULFAMETHOXAZOLE AND TRIMETHOPRIM 800; 160 MG/1; MG/1
1 TABLET ORAL EVERY 12 HOURS SCHEDULED
Status: DISCONTINUED | OUTPATIENT
Start: 2018-01-01 | End: 2018-01-01

## 2018-01-01 RX ADMIN — INSULIN LISPRO 1 UNITS: 100 INJECTION, SOLUTION INTRAVENOUS; SUBCUTANEOUS at 09:06

## 2018-01-01 RX ADMIN — SODIUM CHLORIDE, PRESERVATIVE FREE 10 ML: 5 INJECTION INTRAVENOUS at 21:34

## 2018-01-01 RX ADMIN — ACETAMINOPHEN 1000 MG: 500 TABLET, FILM COATED ORAL at 17:40

## 2018-01-01 RX ADMIN — TAMOXIFEN CITRATE 10 MG: 10 TABLET, FILM COATED ORAL at 09:40

## 2018-01-01 RX ADMIN — ASPIRIN 81 MG CHEWABLE TABLET 324 MG: 81 TABLET CHEWABLE at 17:41

## 2018-01-01 RX ADMIN — INSULIN LISPRO 2 UNITS: 100 INJECTION, SOLUTION INTRAVENOUS; SUBCUTANEOUS at 21:34

## 2018-01-01 RX ADMIN — SODIUM CHLORIDE, PRESERVATIVE FREE 10 ML: 5 INJECTION INTRAVENOUS at 23:28

## 2018-01-01 RX ADMIN — SODIUM CHLORIDE: 9 INJECTION, SOLUTION INTRAVENOUS at 00:29

## 2018-01-01 RX ADMIN — SODIUM CHLORIDE: 9 INJECTION, SOLUTION INTRAVENOUS at 11:22

## 2018-01-01 RX ADMIN — INSULIN LISPRO 1 UNITS: 100 INJECTION, SOLUTION INTRAVENOUS; SUBCUTANEOUS at 08:31

## 2018-01-01 RX ADMIN — Medication 1 TABLET: at 09:13

## 2018-01-01 RX ADMIN — SODIUM BICARBONATE 650 MG TABLET 650 MG: at 08:32

## 2018-01-01 RX ADMIN — Medication 1 TABLET: at 08:32

## 2018-01-01 RX ADMIN — ENOXAPARIN SODIUM 30 MG: 30 INJECTION SUBCUTANEOUS at 08:32

## 2018-01-01 RX ADMIN — SODIUM CHLORIDE, PRESERVATIVE FREE 10 ML: 5 INJECTION INTRAVENOUS at 21:15

## 2018-01-01 RX ADMIN — INSULIN LISPRO 1 UNITS: 100 INJECTION, SOLUTION INTRAVENOUS; SUBCUTANEOUS at 12:44

## 2018-01-01 RX ADMIN — ENOXAPARIN SODIUM 30 MG: 30 INJECTION SUBCUTANEOUS at 09:14

## 2018-01-01 RX ADMIN — SODIUM CHLORIDE, PRESERVATIVE FREE 10 ML: 5 INJECTION INTRAVENOUS at 09:16

## 2018-01-01 RX ADMIN — CLOPIDOGREL BISULFATE 75 MG: 75 TABLET ORAL at 09:13

## 2018-01-01 RX ADMIN — INSULIN LISPRO 2 UNITS: 100 INJECTION, SOLUTION INTRAVENOUS; SUBCUTANEOUS at 18:23

## 2018-01-01 RX ADMIN — SULFAMETHOXAZOLE AND TRIMETHOPRIM 1 TABLET: 400; 80 TABLET ORAL at 09:39

## 2018-01-01 RX ADMIN — CLOPIDOGREL BISULFATE 75 MG: 75 TABLET ORAL at 08:32

## 2018-01-01 RX ADMIN — SULFAMETHOXAZOLE AND TRIMETHOPRIM 1 TABLET: 800; 160 TABLET ORAL at 09:13

## 2018-01-01 RX ADMIN — IOPAMIDOL 88 ML: 755 INJECTION, SOLUTION INTRAVENOUS at 16:21

## 2018-01-01 RX ADMIN — SODIUM BICARBONATE 650 MG TABLET 650 MG: at 09:13

## 2018-01-01 RX ADMIN — SODIUM BICARBONATE 650 MG TABLET 650 MG: at 21:33

## 2018-01-01 RX ADMIN — SULFAMETHOXAZOLE AND TRIMETHOPRIM 1 TABLET: 800; 160 TABLET ORAL at 23:28

## 2018-01-01 RX ADMIN — SODIUM CHLORIDE, PRESERVATIVE FREE 10 ML: 5 INJECTION INTRAVENOUS at 16:21

## 2018-01-01 RX ADMIN — SODIUM BICARBONATE 650 MG TABLET 650 MG: at 09:39

## 2018-01-01 RX ADMIN — TAMOXIFEN CITRATE 10 MG: 10 TABLET, FILM COATED ORAL at 09:07

## 2018-01-01 RX ADMIN — Medication 1 TABLET: at 09:39

## 2018-01-01 RX ADMIN — INSULIN LISPRO 1 UNITS: 100 INJECTION, SOLUTION INTRAVENOUS; SUBCUTANEOUS at 23:27

## 2018-01-01 RX ADMIN — SODIUM CHLORIDE: 9 INJECTION, SOLUTION INTRAVENOUS at 23:28

## 2018-01-01 RX ADMIN — ASPIRIN 81 MG CHEWABLE TABLET 81 MG: 81 TABLET CHEWABLE at 09:13

## 2018-01-01 RX ADMIN — SULFAMETHOXAZOLE AND TRIMETHOPRIM 1 TABLET: 400; 80 TABLET ORAL at 21:16

## 2018-01-01 RX ADMIN — INSULIN LISPRO 4 UNITS: 100 INJECTION, SOLUTION INTRAVENOUS; SUBCUTANEOUS at 12:34

## 2018-01-01 RX ADMIN — CLOPIDOGREL BISULFATE 75 MG: 75 TABLET ORAL at 09:39

## 2018-01-01 RX ADMIN — ASPIRIN 81 MG CHEWABLE TABLET 81 MG: 81 TABLET CHEWABLE at 09:40

## 2018-01-01 RX ADMIN — INSULIN LISPRO 3 UNITS: 100 INJECTION, SOLUTION INTRAVENOUS; SUBCUTANEOUS at 18:32

## 2018-01-01 RX ADMIN — SULFAMETHOXAZOLE AND TRIMETHOPRIM 1 TABLET: 400; 80 TABLET ORAL at 21:33

## 2018-01-01 RX ADMIN — INSULIN LISPRO 1 UNITS: 100 INJECTION, SOLUTION INTRAVENOUS; SUBCUTANEOUS at 21:15

## 2018-01-01 RX ADMIN — TAMOXIFEN CITRATE 10 MG: 10 TABLET, FILM COATED ORAL at 08:31

## 2018-01-01 RX ADMIN — SULFAMETHOXAZOLE AND TRIMETHOPRIM 1 TABLET: 400; 80 TABLET ORAL at 08:31

## 2018-01-01 RX ADMIN — ASPIRIN 81 MG CHEWABLE TABLET 81 MG: 81 TABLET CHEWABLE at 08:32

## 2018-01-01 RX ADMIN — ENOXAPARIN SODIUM 30 MG: 30 INJECTION SUBCUTANEOUS at 09:40

## 2018-01-01 ASSESSMENT — PAIN SCALES - GENERAL
PAINLEVEL_OUTOF10: 0
PAINLEVEL_OUTOF10: 5
PAINLEVEL_OUTOF10: 0

## 2018-02-16 ENCOUNTER — HOSPITAL ENCOUNTER (OUTPATIENT)
Dept: OTHER | Age: 83
Discharge: HOME | End: 2018-02-16
Attending: INTERNAL MEDICINE | Admitting: INTERNAL MEDICINE

## 2018-02-16 LAB
ALBUMIN SERPL-MCNC: 4 GM/DL (ref 3.4–5)
ALP BLD-CCNC: 120 IU/L (ref 40–128)
ALT SERPL-CCNC: 17 U/L (ref 10–40)
ANION GAP SERPL CALCULATED.3IONS-SCNC: 10 MMOL/L (ref 4–16)
AST SERPL-CCNC: 20 IU/L (ref 15–37)
BILIRUB SERPL-MCNC: 0.2 MG/DL (ref 0–1)
BUN BLDV-MCNC: 42 MG/DL (ref 6–23)
CALCIUM SERPL-MCNC: 11.3 MG/DL (ref 8.3–10.6)
CHLORIDE BLD-SCNC: 106 MMOL/L (ref 99–110)
CO2: 25 MMOL/L (ref 21–32)
CREAT SERPL-MCNC: 1.3 MG/DL (ref 0.6–1.1)
FOLATE: >20 NG/ML (ref 3.1–17.5)
GFR AFRICAN AMERICAN: 48 ML/MIN/1.73M2
GFR NON-AFRICAN AMERICAN: 39 ML/MIN/1.73M2
GLUCOSE BLD-MCNC: 153 MG/DL (ref 70–99)
POTASSIUM SERPL-SCNC: 5.1 MMOL/L (ref 3.5–5.1)
SODIUM BLD-SCNC: 141 MMOL/L (ref 135–145)
TOTAL PROTEIN: 6.3 GM/DL (ref 6.4–8.2)
TSH HIGH SENSITIVITY: 1.19 UIU/ML (ref 0.27–4.2)
VITAMIN B-12: 599.6 PG/ML (ref 211–911)

## 2018-02-21 ENCOUNTER — HOSPITAL ENCOUNTER (OUTPATIENT)
Dept: OTHER | Age: 83
Discharge: HOME | End: 2018-02-21
Attending: INTERNAL MEDICINE | Admitting: INTERNAL MEDICINE

## 2018-02-21 LAB
ANION GAP SERPL CALCULATED.3IONS-SCNC: 10 MMOL/L (ref 4–16)
BUN BLDV-MCNC: 38 MG/DL (ref 6–23)
CALCIUM SERPL-MCNC: 10.8 MG/DL (ref 8.3–10.6)
CHLORIDE BLD-SCNC: 105 MMOL/L (ref 99–110)
CO2: 25 MMOL/L (ref 21–32)
CREAT SERPL-MCNC: 1.3 MG/DL (ref 0.6–1.1)
GFR AFRICAN AMERICAN: 48 ML/MIN/1.73M2
GFR NON-AFRICAN AMERICAN: 39 ML/MIN/1.73M2
GLUCOSE BLD-MCNC: 108 MG/DL (ref 70–99)
POTASSIUM SERPL-SCNC: 5.8 MMOL/L (ref 3.5–5.1)
SODIUM BLD-SCNC: 140 MMOL/L (ref 135–145)

## 2018-03-09 ENCOUNTER — HOSPITAL ENCOUNTER (OUTPATIENT)
Dept: WOMENS IMAGING | Age: 83
Discharge: HOME | End: 2018-03-09
Attending: INTERNAL MEDICINE | Admitting: INTERNAL MEDICINE

## 2018-03-09 DIAGNOSIS — C50.912 MALIGNANT NEOPLASM OF LEFT FEMALE BREAST, UNSPECIFIED ESTROGEN RECEPTOR STATUS, UNSPECIFIED SITE OF BREAST (HCC): ICD-10-CM

## 2018-03-09 DIAGNOSIS — C50.912 MALIGNANT NEOPLASM OF LEFT FEMALE BREAST (HCC): ICD-10-CM

## 2018-08-25 PROBLEM — G93.40 ENCEPHALOPATHY ACUTE: Status: ACTIVE | Noted: 2018-01-01

## 2018-10-01 PROBLEM — E11.9 DM (DIABETES MELLITUS) (HCC): Status: ACTIVE | Noted: 2018-01-01

## 2018-10-01 PROBLEM — N18.31 CHRONIC KIDNEY DISEASE (CKD) STAGE G3A/A3, MODERATELY DECREASED GLOMERULAR FILTRATION RATE (GFR) BETWEEN 45-59 ML/MIN/1.73 SQUARE METER AND ALBUMINURIA CREATININE RATIO GREATER THAN 300 MG/G (HCC): Status: ACTIVE | Noted: 2018-01-01

## 2018-10-01 PROBLEM — E83.52 HYPERCALCEMIA: Status: ACTIVE | Noted: 2018-01-01

## 2018-10-15 PROBLEM — G93.41 METABOLIC ENCEPHALOPATHY: Status: ACTIVE | Noted: 2018-01-01

## 2018-10-15 PROBLEM — G93.40 ACUTE ENCEPHALOPATHY: Status: ACTIVE | Noted: 2018-01-01

## 2018-10-15 NOTE — ED NOTES
Pt  left and pt is confused and trying to climb out of bed. This nurse had to reorient pt and tell her she has to stay in bed.      Leonard Goodwin RN  10/15/18 7349

## 2018-10-15 NOTE — ED NOTES
STROKE ALERT CALLED ON PATIENT AT 14:40, RAINBOW,LACTIC ACID,AMMONIA,TYPE & SCREEN AND FIRST SET OF BLOOD CULTURES WAS DRAWN ON PATIENT  PATIENT A  VERY HARD STICK BEEN STUCK NUMEROUS TIMES JUST TO GET THE LABS FROM MYSELF AND Marques Chris. CALLED LAB NOTIFIED Omero [facility] OF LABS BEEN DRAWN ON PATIENT AND PATIENT WAS A HARD STICK IF LABS WERE HEMOLYZED TO CALL THIS PHLEBOT. Berta Victor.  [place]  10/15/18 2042

## 2018-10-15 NOTE — ED PROVIDER NOTES
(HUMALOG) 100 UNIT/ML injection vial Inject 10 Units into the skin 2 times daily (with meals) 1 vial 3    folic acid-pyridoxine-cyancobalamin (FOLTX) 1.13-25-2 MG TABS Take 1 tablet by mouth daily 30 tablet     clopidogrel (PLAVIX) 75 MG tablet Take 1 tablet by mouth daily 30 tablet 3    tamoxifen (NOLVADEX) 20 MG tablet   1    insulin NPH (HUMULIN N;NOVOLIN N) 100 UNIT/ML injection Inject 16 Units into the skin every morning. (Patient taking differently: Inject 20 Units into the skin every morning ) 1 Pen 11     Allergies   Allergen Reactions    Pcn [Penicillins] Hives       Nursing Notes Reviewed    Physical Exam:  ED Triage Vitals   Enc Vitals Group      BP       Pulse       Resp       Temp       Temp src       SpO2       Weight       Height       Head Circumference       Peak Flow       Pain Score       Pain Loc       Pain Edu? Excl. in [address]? GENERAL APPEARANCE: Awake and alert. Cooperative. HEAD: Normocephalic. Atraumatic. EYES: EOM's grossly intact. Sclera anicteric. ENT: Mucous membranes are moist. Tolerates saliva. No trismus. NECK: Supple. No meningismus. Trachea midline. HEART: RRR. Radial pulses 2+. LUNGS: Respirations unlabored. CTAB  ABDOMEN: Soft. Non-tender. No guarding or rebound. EXTREMITIES: No acute deformities. SKIN: Warm and dry. NEUROLOGICAL:   Sunday Presto Ware's NIH Stroke Scale at 6:20 PM is:  Difficult to assess NIH stroke scale is patient has both expressive and receptive aphasia. Patient does not follow my commands and thus I'm unable to obtain adequate NIH stroke scale exam.  Patient unable to identify simple objects such as ink pen. Patient alert to verbal stimulation. Patient not aware of place, time, situation. Patient does not have slurred speech or obvious facial muscle deficits but does use sentences that do not make sense, often answering with \"probably\".         I have reviewed and interpreted all of the currently available lab results from this visit (if

## 2018-10-15 NOTE — ED NOTES
The patient lives at home with her , Saintclair Spearman, who says she was normal at breakfast at 1000 today. When the therapist came for her PT, she was unable to remember her name or address. Unable to identify objects held up per the PA.       Erica Huang RN  10/15/18 7146

## 2018-10-16 NOTE — ED NOTES
@6600 bed 3010 received clean and ready ER RN Dom notified of ready room     Yahir Abrams  10/15/18 8823

## 2018-10-17 NOTE — PROGRESS NOTES
1.4*   --   1.4*  1.7*   GFRAA  44*   --   44*  35*   LABGLOM  36*   --   36*  29*   GLUCOSE  117*  127  189*  134*   PROT  6.4   --    --    --    LABALBU  3.7   --    --    --    CALCIUM  10.6   --   10.3  9.6   BILITOT  0.2   --    --    --    ALKPHOS  145*   --    --    --    AST  23   --    --    --    ALT  18   --    --    --      PT/INR:    Recent Labs      10/15/18   1446   PROTIME  10.1   INR  0.88     Meds:    sodium bicarbonate  650 mg Oral BID    sulfamethoxazole-trimethoprim  1 tablet Oral BID    aspirin  81 mg Oral Daily    clopidogrel  75 mg Oral Daily    folic acid-pyridoxine-cyancobalamin  1 tablet Oral Daily    tamoxifen  10 mg Oral Daily    sodium chloride flush  10 mL Intravenous 2 times per day    enoxaparin  30 mg Subcutaneous Daily    insulin lispro  0-6 Units Subcutaneous TID WC    insulin lispro  0-3 Units Subcutaneous Nightly     PRN Meds: sodium chloride flush, magnesium hydroxide, ondansetron, glucose, dextrose, glucagon (rDNA), dextrose    Assessment/Plan:   1. Altered mental status, metabolic encephalopathy, Enterococcus urinary tract infection. Continue Bactrim. 2.  Hypertension. BP stable. 3.  Diabetes mellitus. BS stable. 4.  Hyperlipidemia. 5.  Chronic kidney disease. 6.  History of left breast cancer, status post lumpectomy. 7.  Increase activity.   Possible DC in am.            Dian Teague MD  10/17/2018 1:14 PM

## 2018-10-18 NOTE — CARE COORDINATION
Pt has been discharged. Pt was seen by previous CM for discharge planning. CM reviewed discharge notes. Pt is home with  and [facility]. CM PS to [facility] to inform.  [address]

## 2018-10-18 NOTE — DISCHARGE SUMMARY
[address]  Discharge Summary     Patient ID  Christy Bynum   1935  [id number]          Admit date: 10/15/2018   Discharge date: 10/18/2018      Admitting Physician: Snehal Mohamud MD   Discharge Physician: Snehal Mohamud MD    Discharge Diagnoses:     1.  Altered mental status, metabolic encephalopathy, Enterococcus urinary tract infection. 2.  Hypertension. BP stable. 3.  Diabetes mellitus. BS stable. 4.  Hyperlipidemia. 5.  Chronic kidney disease. 6.  History of left breast cancer, status post lumpectomy. Discharged Condition: good    Hospital Course: The patient is an 74-year-old female  with multiple medical problems. She presented to emergency room due  to altered mental status. The patient was seen and evaluated by  Kyle Worthington, physician assistant, as well as ER physician,  Dr. Bita Rapp. The patient,s CT scan of the head as well as CTA of  the head and neck was unremarkable. She was not a candidate for any  TPA. The patient had similar episode of metabolic encephalopathy a  few weeks ago and she was diagnosed to have urinary tract infection. After initial evaluation, the patient was admitted to medical floor, I  have seen the patient on the morning of 10/16/2018. The patient still  appears lethargic, but she is moving all the extremities, she opens  her eyes and sometimes she responds. Pt was admitted to med floor. She was noted to have UTI. Started on antibiotics and responded well. Her neuro exam was at her baseline after 24 hrs. Pt was DC to home in a stable condition.      Consults:     none     Significant Diagnostic Studies:   Ct Head Wo Contrast    Result Date: 10/15/2018  EXAMINATION: CT OF THE HEAD WITHOUT CONTRAST 10/15/2018 2:44 pm TECHNIQUE: CT of the head was performed without the administration of intravenous contrast. Dose modulation, iterative reconstruction, and/or weight based adjustment of the mA/kV was utilized to reduce the radiation dose to as low presents with  for altered mentation, slurred words. Patient's last known well is reported as 1135 (3 hours), per patient and  Relevant Medical/Surgical History: 88 ml isovue 370 FINDINGS: CTA NECK: AORTIC ARCH/ARCH VESSELS: There is a normal branch pattern of the aortic arch. No significant stenosis is seen of the innominate artery or subclavian arteries. CAROTID ARTERIES: The common carotid arteries are normal in appearance without evidence of a flow limiting stenosis. The internal carotid arteries are normal in appearance without evidence of a flow limiting stenosis by NASCET criteria. No dissection or arterial injury is seen. VERTEBRAL ARTERIES: The vertebral arteries both arise from the subclavian arteries and are normal in caliber without evidence of flow limiting stenosis or dissection. SOFT TISSUES: The lung apices are clear. No cervical or superior mediastinal lymphadenopathy. The visualized portion of the larynx and pharynx appear unremarkable. The parotid, submandibular and thyroid glands demonstrate no acute abnormality. Right phthisis bulbi BONES: The visualized osseous structures appear unremarkable. CTA HEAD: ANTERIOR CIRCULATION: The internal carotid arteries are normal in course and caliber without focal stenosis. The anterior cerebral and middle cerebral arteries demonstrate no focal stenosis. POSTERIOR CIRCULATION: The posterior cerebral arteries demonstrate no focal stenosis. The vertebral and basilar arteries appear unremarkable. BRAIN: No mass effect or midline shift. No abnormal extra-axial fluid collection. The gray-white differentiation appears grossly maintained. Unremarkable CTA of the head and neck.    -    Recent Results (from the past 24 hour(s))   POCT Glucose    Collection Time: 10/17/18 12:24 PM   Result Value Ref Range    POC Glucose 178 (H) 70 - 99 MG/DL   POCT Glucose    Collection Time: 10/17/18  5:39 PM   Result Value Ref Range    POC Glucose 238 (H) 70 - 99

## 2019-01-01 ENCOUNTER — HOSPITAL ENCOUNTER (OUTPATIENT)
Age: 84
Discharge: HOME | End: 2019-01-29

## 2019-01-01 ENCOUNTER — APPOINTMENT (OUTPATIENT)
Dept: NUCLEAR MEDICINE | Age: 84
DRG: 853 | End: 2019-01-01
Payer: COMMERCIAL

## 2019-01-01 ENCOUNTER — HOSPITAL ENCOUNTER (OUTPATIENT)
Age: 84
Setting detail: SPECIMEN
Discharge: HOME | End: 2019-02-13
Payer: COMMERCIAL

## 2019-01-01 ENCOUNTER — ANESTHESIA (OUTPATIENT)
Dept: NON INVASIVE DIAGNOSTICS | Age: 84
DRG: 853 | End: 2019-01-01
Payer: COMMERCIAL

## 2019-01-01 ENCOUNTER — APPOINTMENT (OUTPATIENT)
Dept: GENERAL RADIOLOGY | Age: 84
DRG: 853 | End: 2019-01-01
Payer: COMMERCIAL

## 2019-01-01 ENCOUNTER — APPOINTMENT (OUTPATIENT)
Dept: MRI IMAGING | Age: 84
DRG: 853 | End: 2019-01-01
Payer: COMMERCIAL

## 2019-01-01 ENCOUNTER — HOSPITAL ENCOUNTER (OUTPATIENT)
Age: 84
Discharge: HOME | End: 2019-02-15

## 2019-01-01 ENCOUNTER — APPOINTMENT (OUTPATIENT)
Dept: CT IMAGING | Age: 84
DRG: 853 | End: 2019-01-01
Payer: COMMERCIAL

## 2019-01-01 ENCOUNTER — APPOINTMENT (OUTPATIENT)
Dept: CT IMAGING | Age: 84
DRG: 637 | End: 2019-01-01
Payer: COMMERCIAL

## 2019-01-01 ENCOUNTER — OUTSIDE SERVICES (OUTPATIENT)
Dept: WOUND CARE | Age: 84
End: 2019-01-01
Payer: COMMERCIAL

## 2019-01-01 ENCOUNTER — ANESTHESIA EVENT (OUTPATIENT)
Dept: NON INVASIVE DIAGNOSTICS | Age: 84
DRG: 853 | End: 2019-01-01
Payer: COMMERCIAL

## 2019-01-01 ENCOUNTER — HOSPITAL ENCOUNTER (OUTPATIENT)
Age: 84
Setting detail: SPECIMEN
Discharge: HOME | End: 2019-03-01
Payer: COMMERCIAL

## 2019-01-01 ENCOUNTER — HOSPITAL ENCOUNTER (OUTPATIENT)
Age: 84
Discharge: HOME | End: 2019-02-05

## 2019-01-01 ENCOUNTER — HOSPITAL ENCOUNTER (INPATIENT)
Age: 84
LOS: 1 days | DRG: 871 | End: 2019-04-02
Attending: FAMILY MEDICINE | Admitting: FAMILY MEDICINE
Payer: COMMERCIAL

## 2019-01-01 ENCOUNTER — APPOINTMENT (OUTPATIENT)
Dept: GENERAL RADIOLOGY | Age: 84
DRG: 637 | End: 2019-01-01
Payer: COMMERCIAL

## 2019-01-01 ENCOUNTER — HOSPITAL ENCOUNTER (OUTPATIENT)
Age: 84
Setting detail: SPECIMEN
Discharge: HOME | End: 2019-01-22

## 2019-01-01 ENCOUNTER — HOSPITAL ENCOUNTER (INPATIENT)
Age: 84
LOS: 2 days | Discharge: HOSPICE | DRG: 871 | End: 2019-04-01
Attending: EMERGENCY MEDICINE | Admitting: EMERGENCY MEDICINE
Payer: COMMERCIAL

## 2019-01-01 ENCOUNTER — APPOINTMENT (OUTPATIENT)
Dept: INTERVENTIONAL RADIOLOGY/VASCULAR | Age: 84
DRG: 853 | End: 2019-01-01
Payer: COMMERCIAL

## 2019-01-01 ENCOUNTER — HOSPITAL ENCOUNTER (INPATIENT)
Age: 84
LOS: 6 days | Discharge: NURSING, REHAB OR LONG-TERM CARE | DRG: 637 | End: 2019-01-21
Attending: EMERGENCY MEDICINE | Admitting: INTERNAL MEDICINE
Payer: COMMERCIAL

## 2019-01-01 ENCOUNTER — HOSPITAL ENCOUNTER (INPATIENT)
Age: 84
LOS: 16 days | Discharge: NURSING, REHAB OR LONG-TERM CARE | DRG: 853 | End: 2019-03-27
Attending: EMERGENCY MEDICINE | Admitting: INTERNAL MEDICINE
Payer: COMMERCIAL

## 2019-01-01 ENCOUNTER — HOSPITAL ENCOUNTER (OUTPATIENT)
Age: 84
Setting detail: SPECIMEN
Discharge: HOME | End: 2019-02-12

## 2019-01-01 ENCOUNTER — HOSPITAL ENCOUNTER (OUTPATIENT)
Age: 84
Setting detail: SPECIMEN
Discharge: HOME | End: 2019-03-29

## 2019-01-01 ENCOUNTER — HOSPITAL ENCOUNTER (OUTPATIENT)
Age: 84
Setting detail: SPECIMEN
Discharge: HOME | End: 2019-02-08

## 2019-01-01 VITALS
RESPIRATION RATE: 16 BRPM | WEIGHT: 134.4 LBS | TEMPERATURE: 97.6 F | OXYGEN SATURATION: 96 % | SYSTOLIC BLOOD PRESSURE: 139 MMHG | BODY MASS INDEX: 24.73 KG/M2 | HEIGHT: 62 IN | HEART RATE: 85 BPM | DIASTOLIC BLOOD PRESSURE: 72 MMHG

## 2019-01-01 VITALS
BODY MASS INDEX: 25.96 KG/M2 | HEART RATE: 71 BPM | RESPIRATION RATE: 19 BRPM | HEIGHT: 62 IN | WEIGHT: 141.09 LBS | SYSTOLIC BLOOD PRESSURE: 123 MMHG | OXYGEN SATURATION: 96 % | TEMPERATURE: 97.3 F | DIASTOLIC BLOOD PRESSURE: 59 MMHG

## 2019-01-01 VITALS — DIASTOLIC BLOOD PRESSURE: 70 MMHG | SYSTOLIC BLOOD PRESSURE: 162 MMHG | OXYGEN SATURATION: 100 %

## 2019-01-01 VITALS
SYSTOLIC BLOOD PRESSURE: 102 MMHG | OXYGEN SATURATION: 98 % | WEIGHT: 141.09 LBS | HEART RATE: 41 BPM | RESPIRATION RATE: 16 BRPM | HEIGHT: 62 IN | DIASTOLIC BLOOD PRESSURE: 51 MMHG | TEMPERATURE: 90.9 F | BODY MASS INDEX: 25.96 KG/M2

## 2019-01-01 VITALS
DIASTOLIC BLOOD PRESSURE: 49 MMHG | HEART RATE: 45 BPM | OXYGEN SATURATION: 93 % | RESPIRATION RATE: 24 BRPM | SYSTOLIC BLOOD PRESSURE: 85 MMHG

## 2019-01-01 DIAGNOSIS — R77.8 TROPONIN LEVEL ELEVATED: ICD-10-CM

## 2019-01-01 DIAGNOSIS — R40.4 TRANSIENT ALTERATION OF AWARENESS: Primary | ICD-10-CM

## 2019-01-01 DIAGNOSIS — R79.89 SERUM CREATININE RAISED: ICD-10-CM

## 2019-01-01 DIAGNOSIS — R41.82 ALTERED MENTAL STATUS, UNSPECIFIED ALTERED MENTAL STATUS TYPE: Primary | ICD-10-CM

## 2019-01-01 DIAGNOSIS — E16.2 HYPOGLYCEMIA: ICD-10-CM

## 2019-01-01 DIAGNOSIS — D64.9 ANEMIA, UNSPECIFIED TYPE: ICD-10-CM

## 2019-01-01 DIAGNOSIS — S61.203D OPEN WOUND OF LEFT MIDDLE FINGER WITHOUT DAMAGE TO NAIL, SUBSEQUENT ENCOUNTER: ICD-10-CM

## 2019-01-01 DIAGNOSIS — M79.81: ICD-10-CM

## 2019-01-01 DIAGNOSIS — E87.29 RESPIRATORY ACIDOSIS: ICD-10-CM

## 2019-01-01 DIAGNOSIS — N39.0 URINARY TRACT INFECTION WITHOUT HEMATURIA, SITE UNSPECIFIED: ICD-10-CM

## 2019-01-01 DIAGNOSIS — J18.9 PNEUMONIA DUE TO ORGANISM: ICD-10-CM

## 2019-01-01 DIAGNOSIS — R77.8 ELEVATED TROPONIN: ICD-10-CM

## 2019-01-01 DIAGNOSIS — E86.0 DEHYDRATION: ICD-10-CM

## 2019-01-01 LAB
ABO/RH: NORMAL
ADENOVIRUS DETECTION BY PCR: NOT DETECTED
ALBUMIN SERPL-MCNC: 2.6 GM/DL (ref 3.4–5)
ALBUMIN SERPL-MCNC: 2.7 GM/DL (ref 3.4–5)
ALBUMIN SERPL-MCNC: 2.8 GM/DL (ref 3.4–5)
ALBUMIN SERPL-MCNC: 3 GM/DL (ref 3.4–5)
ALBUMIN SERPL-MCNC: 3 GM/DL (ref 3.4–5)
ALBUMIN SERPL-MCNC: 3.2 GM/DL (ref 3.4–5)
ALBUMIN SERPL-MCNC: 3.6 GM/DL (ref 3.4–5)
ALBUMIN SERPL-MCNC: 3.9 GM/DL (ref 3.4–5)
ALP BLD-CCNC: 108 IU/L (ref 40–128)
ALP BLD-CCNC: 111 IU/L (ref 40–128)
ALP BLD-CCNC: 114 IU/L (ref 40–128)
ALP BLD-CCNC: 115 IU/L (ref 40–129)
ALP BLD-CCNC: 117 IU/L (ref 40–129)
ALP BLD-CCNC: 125 IU/L (ref 40–128)
ALP BLD-CCNC: 125 IU/L (ref 40–128)
ALP BLD-CCNC: 129 IU/L (ref 40–129)
ALP BLD-CCNC: 133 IU/L (ref 40–129)
ALP BLD-CCNC: 149 IU/L (ref 40–128)
ALP BLD-CCNC: 161 IU/L (ref 40–129)
ALT SERPL-CCNC: 21 U/L (ref 10–40)
ALT SERPL-CCNC: 33 U/L (ref 10–40)
ALT SERPL-CCNC: 38 U/L (ref 10–40)
ALT SERPL-CCNC: 42 U/L (ref 10–40)
ALT SERPL-CCNC: 46 U/L (ref 10–40)
ALT SERPL-CCNC: 51 U/L (ref 10–40)
ALT SERPL-CCNC: 6 U/L (ref 10–40)
ALT SERPL-CCNC: 71 U/L (ref 10–40)
ALT SERPL-CCNC: <5 U/L (ref 10–40)
AMMONIA: 23 UMOL/L (ref 11–51)
ANION GAP SERPL CALCULATED.3IONS-SCNC: 10 MMOL/L (ref 4–16)
ANION GAP SERPL CALCULATED.3IONS-SCNC: 11 MMOL/L (ref 4–16)
ANION GAP SERPL CALCULATED.3IONS-SCNC: 12 MMOL/L (ref 4–16)
ANION GAP SERPL CALCULATED.3IONS-SCNC: 12 MMOL/L (ref 4–16)
ANION GAP SERPL CALCULATED.3IONS-SCNC: 13 MMOL/L (ref 4–16)
ANION GAP SERPL CALCULATED.3IONS-SCNC: 6 MMOL/L (ref 4–16)
ANION GAP SERPL CALCULATED.3IONS-SCNC: 7 MMOL/L (ref 4–16)
ANION GAP SERPL CALCULATED.3IONS-SCNC: 8 MMOL/L (ref 4–16)
ANION GAP SERPL CALCULATED.3IONS-SCNC: 9 MMOL/L (ref 4–16)
ANISOCYTOSIS: ABNORMAL
ANTIBODY SCREEN: NEGATIVE
APTT: 34 SECONDS (ref 21.2–33)
APTT: 47.3 SECONDS (ref 21.2–33)
APTT: 49.6 SECONDS (ref 21.2–33)
AST SERPL-CCNC: 10 IU/L (ref 15–37)
AST SERPL-CCNC: 10 IU/L (ref 15–37)
AST SERPL-CCNC: 12 IU/L (ref 15–37)
AST SERPL-CCNC: 18 IU/L (ref 15–37)
AST SERPL-CCNC: 20 IU/L (ref 15–37)
AST SERPL-CCNC: 22 IU/L (ref 15–37)
AST SERPL-CCNC: 23 IU/L (ref 15–37)
AST SERPL-CCNC: 29 IU/L (ref 15–37)
AST SERPL-CCNC: 32 IU/L (ref 15–37)
AST SERPL-CCNC: 38 IU/L (ref 15–37)
AST SERPL-CCNC: 41 IU/L (ref 15–37)
BACTERIA: ABNORMAL /HPF
BACTERIA: NEGATIVE /HPF
BACTERIA: NEGATIVE /HPF
BANDED NEUTROPHILS ABSOLUTE COUNT: 2.62 K/CU MM
BANDED NEUTROPHILS ABSOLUTE COUNT: 3.1 K/CU MM
BANDED NEUTROPHILS RELATIVE PERCENT: 20 % (ref 5–11)
BANDED NEUTROPHILS RELATIVE PERCENT: 20 % (ref 5–11)
BASE EXCESS MIXED: ABNORMAL (ref 0–2.3)
BASE EXCESS: 2 (ref 0–2.4)
BASE EXCESS: 3 (ref 0–2.4)
BASE EXCESS: ABNORMAL (ref 0–2.4)
BASOPHILS ABSOLUTE: 0 K/CU MM
BASOPHILS RELATIVE PERCENT: 0.2 % (ref 0–1)
BASOPHILS RELATIVE PERCENT: 0.2 % (ref 0–1)
BASOPHILS RELATIVE PERCENT: 0.4 % (ref 0–1)
BASOPHILS RELATIVE PERCENT: 0.5 % (ref 0–1)
BASOPHILS RELATIVE PERCENT: 0.7 % (ref 0–1)
BASOPHILS RELATIVE PERCENT: 0.7 % (ref 0–1)
BASOPHILS RELATIVE PERCENT: 1.1 % (ref 0–1)
BILIRUB SERPL-MCNC: 0.2 MG/DL (ref 0–1)
BILIRUB SERPL-MCNC: 0.3 MG/DL (ref 0–1)
BILIRUBIN URINE: NEGATIVE MG/DL
BLOOD, URINE: ABNORMAL
BLOOD, URINE: NEGATIVE
BLOOD, URINE: NEGATIVE
BORDETELLA PERTUSSIS PCR: NOT DETECTED
BUN BLDV-MCNC: 27 MG/DL (ref 6–23)
BUN BLDV-MCNC: 30 MG/DL (ref 6–23)
BUN BLDV-MCNC: 30 MG/DL (ref 6–23)
BUN BLDV-MCNC: 34 MG/DL (ref 6–23)
BUN BLDV-MCNC: 35 MG/DL (ref 6–23)
BUN BLDV-MCNC: 36 MG/DL (ref 6–23)
BUN BLDV-MCNC: 38 MG/DL (ref 6–23)
BUN BLDV-MCNC: 39 MG/DL (ref 6–23)
BUN BLDV-MCNC: 40 MG/DL (ref 6–23)
BUN BLDV-MCNC: 41 MG/DL (ref 6–23)
BUN BLDV-MCNC: 41 MG/DL (ref 6–23)
BUN BLDV-MCNC: 42 MG/DL (ref 6–23)
BUN BLDV-MCNC: 42 MG/DL (ref 6–23)
BUN BLDV-MCNC: 43 MG/DL (ref 6–23)
BUN BLDV-MCNC: 44 MG/DL (ref 6–23)
BUN BLDV-MCNC: 46 MG/DL (ref 6–23)
BUN BLDV-MCNC: 46 MG/DL (ref 6–23)
BUN BLDV-MCNC: 49 MG/DL (ref 6–23)
BUN BLDV-MCNC: 51 MG/DL (ref 6–23)
BUN BLDV-MCNC: 51 MG/DL (ref 6–23)
BUN BLDV-MCNC: 58 MG/DL (ref 6–23)
BUN BLDV-MCNC: 59 MG/DL (ref 6–23)
CALCIUM SERPL-MCNC: 10 MG/DL (ref 8.3–10.6)
CALCIUM SERPL-MCNC: 10 MG/DL (ref 8.3–10.6)
CALCIUM SERPL-MCNC: 10.1 MG/DL (ref 8.3–10.6)
CALCIUM SERPL-MCNC: 10.1 MG/DL (ref 8.3–10.6)
CALCIUM SERPL-MCNC: 10.2 MG/DL (ref 8.3–10.6)
CALCIUM SERPL-MCNC: 10.3 MG/DL (ref 8.3–10.6)
CALCIUM SERPL-MCNC: 10.3 MG/DL (ref 8.3–10.6)
CALCIUM SERPL-MCNC: 10.4 MG/DL (ref 8.3–10.6)
CALCIUM SERPL-MCNC: 10.5 MG/DL (ref 8.3–10.6)
CALCIUM SERPL-MCNC: 10.7 MG/DL (ref 8.3–10.6)
CALCIUM SERPL-MCNC: 10.9 MG/DL (ref 8.3–10.6)
CALCIUM SERPL-MCNC: 10.9 MG/DL (ref 8.3–10.6)
CALCIUM SERPL-MCNC: 11 MG/DL (ref 8.3–10.6)
CALCIUM SERPL-MCNC: 11.2 MG/DL (ref 8.3–10.6)
CALCIUM SERPL-MCNC: 7.9 MG/DL (ref 8.3–10.6)
CALCIUM SERPL-MCNC: 8.9 MG/DL (ref 8.3–10.6)
CALCIUM SERPL-MCNC: 9.2 MG/DL (ref 8.3–10.6)
CALCIUM SERPL-MCNC: 9.5 MG/DL (ref 8.3–10.6)
CALCIUM SERPL-MCNC: 9.6 MG/DL (ref 8.3–10.6)
CALCIUM SERPL-MCNC: 9.7 MG/DL (ref 8.3–10.6)
CALCIUM SERPL-MCNC: 9.8 MG/DL (ref 8.3–10.6)
CALCIUM SERPL-MCNC: 9.9 MG/DL (ref 8.3–10.6)
CARBON MONOXIDE, BLOOD: 0 % (ref 0–5)
CARBON MONOXIDE, BLOOD: 0 % (ref 0–5)
CHLAMYDOPHILA PNEUMONIA PCR: NOT DETECTED
CHLORIDE BLD-SCNC: 100 MMOL/L (ref 99–110)
CHLORIDE BLD-SCNC: 100 MMOL/L (ref 99–110)
CHLORIDE BLD-SCNC: 103 MMOL/L (ref 99–110)
CHLORIDE BLD-SCNC: 103 MMOL/L (ref 99–110)
CHLORIDE BLD-SCNC: 104 MMOL/L (ref 99–110)
CHLORIDE BLD-SCNC: 105 MMOL/L (ref 99–110)
CHLORIDE BLD-SCNC: 106 MMOL/L (ref 99–110)
CHLORIDE BLD-SCNC: 107 MMOL/L (ref 99–110)
CHLORIDE BLD-SCNC: 108 MMOL/L (ref 99–110)
CHLORIDE BLD-SCNC: 109 MMOL/L (ref 99–110)
CHLORIDE BLD-SCNC: 111 MMOL/L (ref 99–110)
CHLORIDE BLD-SCNC: 112 MMOL/L (ref 99–110)
CHLORIDE BLD-SCNC: 114 MMOL/L (ref 99–110)
CHLORIDE BLD-SCNC: 117 MMOL/L (ref 99–110)
CHLORIDE BLD-SCNC: 119 MMOL/L (ref 99–110)
CHLORIDE BLD-SCNC: 120 MMOL/L (ref 99–110)
CHLORIDE BLD-SCNC: 99 MMOL/L (ref 99–110)
CHOLESTEROL: 224 MG/DL
CHP ED QC CHECK: YES
CHP ED QC CHECK: YES
CLARITY: ABNORMAL
CLARITY: ABNORMAL
CLARITY: CLEAR
CO2 CONTENT: 25 MMOL/L (ref 19–24)
CO2 CONTENT: 25.6 MMOL/L (ref 19–24)
CO2: 20 MMOL/L (ref 21–32)
CO2: 21 MMOL/L (ref 21–32)
CO2: 22 MMOL/L (ref 21–32)
CO2: 22 MMOL/L (ref 21–32)
CO2: 23 MMOL/L (ref 21–32)
CO2: 24 MMOL/L (ref 21–32)
CO2: 25 MMOL/L (ref 21–32)
CO2: 26 MMOL/L (ref 21–32)
CO2: 26 MMOL/L (ref 21–32)
CO2: 27 MMOL/L (ref 21–32)
CO2: 28 MMOL/L (ref 21–32)
CO2: 28 MMOL/L (ref 21–32)
CO2: 31 MMOL/L (ref 21–32)
COLOR: YELLOW
COMMENT: ABNORMAL
COMMENT: ABNORMAL
COMPONENT: NORMAL
COMPONENT: NORMAL
CORONAVIRUS 229E PCR: NOT DETECTED
CORONAVIRUS HKU1 PCR: NOT DETECTED
CORONAVIRUS NL63 PCR: NOT DETECTED
CORONAVIRUS OC43 PCR: NOT DETECTED
CREAT SERPL-MCNC: 1.2 MG/DL (ref 0.6–1.1)
CREAT SERPL-MCNC: 1.3 MG/DL (ref 0.6–1.1)
CREAT SERPL-MCNC: 1.3 MG/DL (ref 0.6–1.1)
CREAT SERPL-MCNC: 1.4 MG/DL (ref 0.6–1.1)
CREAT SERPL-MCNC: 1.7 MG/DL (ref 0.6–1.1)
CREAT SERPL-MCNC: 1.8 MG/DL (ref 0.6–1.1)
CREAT SERPL-MCNC: 1.8 MG/DL (ref 0.6–1.1)
CREAT SERPL-MCNC: 1.9 MG/DL (ref 0.6–1.1)
CREAT SERPL-MCNC: 2 MG/DL (ref 0.6–1.1)
CREAT SERPL-MCNC: 2.2 MG/DL (ref 0.6–1.1)
CREAT SERPL-MCNC: 2.4 MG/DL (ref 0.6–1.1)
CREAT SERPL-MCNC: 2.5 MG/DL (ref 0.6–1.1)
CREAT SERPL-MCNC: 2.6 MG/DL (ref 0.6–1.1)
CREAT SERPL-MCNC: 2.7 MG/DL (ref 0.6–1.1)
CREAT SERPL-MCNC: 2.8 MG/DL (ref 0.6–1.1)
CREATININE URINE: 61.8 MG/DL (ref 28–217)
CROSSMATCH RESULT: NORMAL
CROSSMATCH RESULT: NORMAL
CULTURE: ABNORMAL
CULTURE: NORMAL
DIFFERENTIAL TYPE: ABNORMAL
DOSE AMOUNT: ABNORMAL
DOSE AMOUNT: NORMAL
DOSE TIME: ABNORMAL
DOSE TIME: NORMAL
EKG ATRIAL RATE: 77 BPM
EKG ATRIAL RATE: 78 BPM
EKG ATRIAL RATE: 91 BPM
EKG DIAGNOSIS: NORMAL
EKG P AXIS: 43 DEGREES
EKG P AXIS: 50 DEGREES
EKG P-R INTERVAL: 172 MS
EKG P-R INTERVAL: 180 MS
EKG Q-T INTERVAL: 396 MS
EKG Q-T INTERVAL: 402 MS
EKG Q-T INTERVAL: 406 MS
EKG QRS DURATION: 140 MS
EKG QRS DURATION: 150 MS
EKG QRS DURATION: 156 MS
EKG QTC CALCULATION (BAZETT): 454 MS
EKG QTC CALCULATION (BAZETT): 462 MS
EKG QTC CALCULATION (BAZETT): 484 MS
EKG R AXIS: -61 DEGREES
EKG R AXIS: -63 DEGREES
EKG R AXIS: -65 DEGREES
EKG T AXIS: 22 DEGREES
EKG T AXIS: 26 DEGREES
EKG T AXIS: 34 DEGREES
EKG VENTRICULAR RATE: 77 BPM
EKG VENTRICULAR RATE: 78 BPM
EKG VENTRICULAR RATE: 90 BPM
EOSINOPHILS ABSOLUTE: 0 K/CU MM
EOSINOPHILS ABSOLUTE: 0 K/CU MM
EOSINOPHILS ABSOLUTE: 0.1 K/CU MM
EOSINOPHILS ABSOLUTE: 0.1 K/CU MM
EOSINOPHILS ABSOLUTE: 0.2 K/CU MM
EOSINOPHILS ABSOLUTE: 0.2 K/CU MM
EOSINOPHILS ABSOLUTE: 0.3 K/CU MM
EOSINOPHILS RELATIVE PERCENT: 0.1 % (ref 0–3)
EOSINOPHILS RELATIVE PERCENT: 0.6 % (ref 0–3)
EOSINOPHILS RELATIVE PERCENT: 1.2 % (ref 0–3)
EOSINOPHILS RELATIVE PERCENT: 3.3 % (ref 0–3)
EOSINOPHILS RELATIVE PERCENT: 3.4 % (ref 0–3)
EOSINOPHILS RELATIVE PERCENT: 3.7 % (ref 0–3)
EOSINOPHILS RELATIVE PERCENT: 3.9 % (ref 0–3)
ERYTHROCYTE SEDIMENTATION RATE: 108 MM/HR (ref 0–30)
ERYTHROCYTE SEDIMENTATION RATE: >120 MM/HR (ref 0–30)
ESTIMATED AVERAGE GLUCOSE: 166 MG/DL
ESTIMATED AVERAGE GLUCOSE: 169 MG/DL
ESTIMATED AVERAGE GLUCOSE: 177 MG/DL
FLUID TYPE: NORMAL INDEX
GFR AFRICAN AMERICAN: 20 ML/MIN/1.73M2
GFR AFRICAN AMERICAN: 21 ML/MIN/1.73M2
GFR AFRICAN AMERICAN: 22 ML/MIN/1.73M2
GFR AFRICAN AMERICAN: 23 ML/MIN/1.73M2
GFR AFRICAN AMERICAN: 26 ML/MIN/1.73M2
GFR AFRICAN AMERICAN: 29 ML/MIN/1.73M2
GFR AFRICAN AMERICAN: 31 ML/MIN/1.73M2
GFR AFRICAN AMERICAN: 33 ML/MIN/1.73M2
GFR AFRICAN AMERICAN: 33 ML/MIN/1.73M2
GFR AFRICAN AMERICAN: 35 ML/MIN/1.73M2
GFR AFRICAN AMERICAN: 44 ML/MIN/1.73M2
GFR AFRICAN AMERICAN: 48 ML/MIN/1.73M2
GFR AFRICAN AMERICAN: 48 ML/MIN/1.73M2
GFR AFRICAN AMERICAN: 52 ML/MIN/1.73M2
GFR NON-AFRICAN AMERICAN: 16 ML/MIN/1.73M2
GFR NON-AFRICAN AMERICAN: 17 ML/MIN/1.73M2
GFR NON-AFRICAN AMERICAN: 18 ML/MIN/1.73M2
GFR NON-AFRICAN AMERICAN: 19 ML/MIN/1.73M2
GFR NON-AFRICAN AMERICAN: 21 ML/MIN/1.73M2
GFR NON-AFRICAN AMERICAN: 24 ML/MIN/1.73M2
GFR NON-AFRICAN AMERICAN: 25 ML/MIN/1.73M2
GFR NON-AFRICAN AMERICAN: 27 ML/MIN/1.73M2
GFR NON-AFRICAN AMERICAN: 27 ML/MIN/1.73M2
GFR NON-AFRICAN AMERICAN: 29 ML/MIN/1.73M2
GFR NON-AFRICAN AMERICAN: 36 ML/MIN/1.73M2
GFR NON-AFRICAN AMERICAN: 39 ML/MIN/1.73M2
GFR NON-AFRICAN AMERICAN: 39 ML/MIN/1.73M2
GFR NON-AFRICAN AMERICAN: 43 ML/MIN/1.73M2
GLUCOSE BLD-MCNC: 102 MG/DL (ref 70–99)
GLUCOSE BLD-MCNC: 103 MG/DL (ref 70–99)
GLUCOSE BLD-MCNC: 105 MG/DL (ref 70–99)
GLUCOSE BLD-MCNC: 111 MG/DL (ref 70–99)
GLUCOSE BLD-MCNC: 112 MG/DL (ref 70–99)
GLUCOSE BLD-MCNC: 112 MG/DL (ref 70–99)
GLUCOSE BLD-MCNC: 113 MG/DL (ref 70–99)
GLUCOSE BLD-MCNC: 114 MG/DL (ref 70–99)
GLUCOSE BLD-MCNC: 114 MG/DL (ref 70–99)
GLUCOSE BLD-MCNC: 116 MG/DL (ref 70–99)
GLUCOSE BLD-MCNC: 118 MG/DL (ref 70–99)
GLUCOSE BLD-MCNC: 120 MG/DL (ref 70–99)
GLUCOSE BLD-MCNC: 120 MG/DL (ref 70–99)
GLUCOSE BLD-MCNC: 121 MG/DL (ref 70–99)
GLUCOSE BLD-MCNC: 121 MG/DL (ref 70–99)
GLUCOSE BLD-MCNC: 123 MG/DL (ref 70–99)
GLUCOSE BLD-MCNC: 124 MG/DL (ref 70–99)
GLUCOSE BLD-MCNC: 126 MG/DL (ref 70–99)
GLUCOSE BLD-MCNC: 128 MG/DL (ref 70–99)
GLUCOSE BLD-MCNC: 130 MG/DL (ref 70–99)
GLUCOSE BLD-MCNC: 131 MG/DL (ref 70–99)
GLUCOSE BLD-MCNC: 131 MG/DL (ref 70–99)
GLUCOSE BLD-MCNC: 132 MG/DL (ref 70–99)
GLUCOSE BLD-MCNC: 132 MG/DL (ref 70–99)
GLUCOSE BLD-MCNC: 134 MG/DL (ref 70–99)
GLUCOSE BLD-MCNC: 136 MG/DL (ref 70–99)
GLUCOSE BLD-MCNC: 137 MG/DL (ref 70–99)
GLUCOSE BLD-MCNC: 137 MG/DL (ref 70–99)
GLUCOSE BLD-MCNC: 139 MG/DL (ref 70–99)
GLUCOSE BLD-MCNC: 140 MG/DL (ref 70–99)
GLUCOSE BLD-MCNC: 142 MG/DL (ref 70–99)
GLUCOSE BLD-MCNC: 143 MG/DL (ref 70–99)
GLUCOSE BLD-MCNC: 147 MG/DL (ref 70–99)
GLUCOSE BLD-MCNC: 147 MG/DL (ref 70–99)
GLUCOSE BLD-MCNC: 149 MG/DL (ref 70–99)
GLUCOSE BLD-MCNC: 151 MG/DL (ref 70–99)
GLUCOSE BLD-MCNC: 152 MG/DL (ref 70–99)
GLUCOSE BLD-MCNC: 152 MG/DL (ref 70–99)
GLUCOSE BLD-MCNC: 153 MG/DL
GLUCOSE BLD-MCNC: 153 MG/DL (ref 70–99)
GLUCOSE BLD-MCNC: 155 MG/DL (ref 70–99)
GLUCOSE BLD-MCNC: 156 MG/DL (ref 70–99)
GLUCOSE BLD-MCNC: 156 MG/DL (ref 70–99)
GLUCOSE BLD-MCNC: 158 MG/DL (ref 70–99)
GLUCOSE BLD-MCNC: 159 MG/DL (ref 70–99)
GLUCOSE BLD-MCNC: 161 MG/DL (ref 70–99)
GLUCOSE BLD-MCNC: 162 MG/DL (ref 70–99)
GLUCOSE BLD-MCNC: 164 MG/DL (ref 70–99)
GLUCOSE BLD-MCNC: 165 MG/DL (ref 70–99)
GLUCOSE BLD-MCNC: 166 MG/DL (ref 70–99)
GLUCOSE BLD-MCNC: 168 MG/DL (ref 70–99)
GLUCOSE BLD-MCNC: 168 MG/DL (ref 70–99)
GLUCOSE BLD-MCNC: 170 MG/DL (ref 70–99)
GLUCOSE BLD-MCNC: 170 MG/DL (ref 70–99)
GLUCOSE BLD-MCNC: 173 MG/DL (ref 70–99)
GLUCOSE BLD-MCNC: 175 MG/DL (ref 70–99)
GLUCOSE BLD-MCNC: 176 MG/DL
GLUCOSE BLD-MCNC: 176 MG/DL (ref 70–99)
GLUCOSE BLD-MCNC: 177 MG/DL (ref 70–99)
GLUCOSE BLD-MCNC: 178 MG/DL (ref 70–99)
GLUCOSE BLD-MCNC: 179 MG/DL (ref 70–99)
GLUCOSE BLD-MCNC: 180 MG/DL (ref 70–99)
GLUCOSE BLD-MCNC: 181 MG/DL (ref 70–99)
GLUCOSE BLD-MCNC: 181 MG/DL (ref 70–99)
GLUCOSE BLD-MCNC: 182 MG/DL (ref 70–99)
GLUCOSE BLD-MCNC: 186 MG/DL (ref 70–99)
GLUCOSE BLD-MCNC: 186 MG/DL (ref 70–99)
GLUCOSE BLD-MCNC: 187 MG/DL (ref 70–99)
GLUCOSE BLD-MCNC: 190 MG/DL (ref 70–99)
GLUCOSE BLD-MCNC: 195 MG/DL (ref 70–99)
GLUCOSE BLD-MCNC: 195 MG/DL (ref 70–99)
GLUCOSE BLD-MCNC: 196 MG/DL (ref 70–99)
GLUCOSE BLD-MCNC: 196 MG/DL (ref 70–99)
GLUCOSE BLD-MCNC: 197 MG/DL (ref 70–99)
GLUCOSE BLD-MCNC: 198 MG/DL (ref 70–99)
GLUCOSE BLD-MCNC: 198 MG/DL (ref 70–99)
GLUCOSE BLD-MCNC: 199 MG/DL (ref 70–99)
GLUCOSE BLD-MCNC: 200 MG/DL (ref 70–99)
GLUCOSE BLD-MCNC: 203 MG/DL (ref 70–99)
GLUCOSE BLD-MCNC: 203 MG/DL (ref 70–99)
GLUCOSE BLD-MCNC: 205 MG/DL (ref 70–99)
GLUCOSE BLD-MCNC: 206 MG/DL (ref 70–99)
GLUCOSE BLD-MCNC: 208 MG/DL (ref 70–99)
GLUCOSE BLD-MCNC: 212 MG/DL (ref 70–99)
GLUCOSE BLD-MCNC: 213 MG/DL (ref 70–99)
GLUCOSE BLD-MCNC: 215 MG/DL (ref 70–99)
GLUCOSE BLD-MCNC: 217 MG/DL (ref 70–99)
GLUCOSE BLD-MCNC: 219 MG/DL (ref 70–99)
GLUCOSE BLD-MCNC: 219 MG/DL (ref 70–99)
GLUCOSE BLD-MCNC: 220 MG/DL (ref 70–99)
GLUCOSE BLD-MCNC: 222 MG/DL (ref 70–99)
GLUCOSE BLD-MCNC: 223 MG/DL (ref 70–99)
GLUCOSE BLD-MCNC: 228 MG/DL (ref 70–99)
GLUCOSE BLD-MCNC: 229 MG/DL (ref 70–99)
GLUCOSE BLD-MCNC: 229 MG/DL (ref 70–99)
GLUCOSE BLD-MCNC: 230 MG/DL (ref 70–99)
GLUCOSE BLD-MCNC: 230 MG/DL (ref 70–99)
GLUCOSE BLD-MCNC: 231 MG/DL (ref 70–99)
GLUCOSE BLD-MCNC: 232 MG/DL (ref 70–99)
GLUCOSE BLD-MCNC: 233 MG/DL (ref 70–99)
GLUCOSE BLD-MCNC: 234 MG/DL (ref 70–99)
GLUCOSE BLD-MCNC: 236 MG/DL (ref 70–99)
GLUCOSE BLD-MCNC: 237 MG/DL (ref 70–99)
GLUCOSE BLD-MCNC: 240 MG/DL (ref 70–99)
GLUCOSE BLD-MCNC: 241 MG/DL (ref 70–99)
GLUCOSE BLD-MCNC: 245 MG/DL (ref 70–99)
GLUCOSE BLD-MCNC: 248 MG/DL (ref 70–99)
GLUCOSE BLD-MCNC: 249 MG/DL (ref 70–99)
GLUCOSE BLD-MCNC: 249 MG/DL (ref 70–99)
GLUCOSE BLD-MCNC: 256 MG/DL (ref 70–99)
GLUCOSE BLD-MCNC: 257 MG/DL (ref 70–99)
GLUCOSE BLD-MCNC: 259 MG/DL (ref 70–99)
GLUCOSE BLD-MCNC: 259 MG/DL (ref 70–99)
GLUCOSE BLD-MCNC: 260 MG/DL (ref 70–99)
GLUCOSE BLD-MCNC: 265 MG/DL (ref 70–99)
GLUCOSE BLD-MCNC: 266 MG/DL (ref 70–99)
GLUCOSE BLD-MCNC: 269 MG/DL (ref 70–99)
GLUCOSE BLD-MCNC: 272 MG/DL (ref 70–99)
GLUCOSE BLD-MCNC: 277 MG/DL (ref 70–99)
GLUCOSE BLD-MCNC: 279 MG/DL (ref 70–99)
GLUCOSE BLD-MCNC: 282 MG/DL (ref 70–99)
GLUCOSE BLD-MCNC: 283 MG/DL (ref 70–99)
GLUCOSE BLD-MCNC: 283 MG/DL (ref 70–99)
GLUCOSE BLD-MCNC: 285 MG/DL (ref 70–99)
GLUCOSE BLD-MCNC: 291 MG/DL (ref 70–99)
GLUCOSE BLD-MCNC: 292 MG/DL (ref 70–99)
GLUCOSE BLD-MCNC: 293 MG/DL (ref 70–99)
GLUCOSE BLD-MCNC: 306 MG/DL (ref 70–99)
GLUCOSE BLD-MCNC: 309 MG/DL (ref 70–99)
GLUCOSE BLD-MCNC: 324 MG/DL (ref 70–99)
GLUCOSE BLD-MCNC: 324 MG/DL (ref 70–99)
GLUCOSE BLD-MCNC: 332 MG/DL (ref 70–99)
GLUCOSE BLD-MCNC: 334 MG/DL (ref 70–99)
GLUCOSE BLD-MCNC: 356 MG/DL (ref 70–99)
GLUCOSE BLD-MCNC: 370 MG/DL (ref 70–99)
GLUCOSE BLD-MCNC: 378 MG/DL (ref 70–99)
GLUCOSE BLD-MCNC: 397 MG/DL (ref 70–99)
GLUCOSE BLD-MCNC: 403 MG/DL (ref 70–99)
GLUCOSE BLD-MCNC: 426 MG/DL (ref 70–99)
GLUCOSE BLD-MCNC: 47 MG/DL (ref 70–99)
GLUCOSE BLD-MCNC: 48 MG/DL (ref 70–99)
GLUCOSE BLD-MCNC: 49 MG/DL (ref 70–99)
GLUCOSE BLD-MCNC: 56 MG/DL (ref 70–99)
GLUCOSE BLD-MCNC: 59 MG/DL (ref 70–99)
GLUCOSE BLD-MCNC: 64 MG/DL (ref 70–99)
GLUCOSE BLD-MCNC: 65 MG/DL (ref 70–99)
GLUCOSE BLD-MCNC: 68 MG/DL (ref 70–99)
GLUCOSE BLD-MCNC: 73 MG/DL (ref 70–99)
GLUCOSE BLD-MCNC: 74 MG/DL (ref 70–99)
GLUCOSE BLD-MCNC: 75 MG/DL (ref 70–99)
GLUCOSE BLD-MCNC: 76 MG/DL (ref 70–99)
GLUCOSE BLD-MCNC: 84 MG/DL (ref 70–99)
GLUCOSE BLD-MCNC: 85 MG/DL (ref 70–99)
GLUCOSE BLD-MCNC: 98 MG/DL (ref 70–99)
GLUCOSE BLD-MCNC: 99 MG/DL (ref 70–99)
GLUCOSE BLD-MCNC: <25 MG/DL (ref 70–99)
GLUCOSE, FLUID: NORMAL MG/DL
GLUCOSE, URINE: >500 MG/DL
GRAM SMEAR: NORMAL
HBA1C MFR BLD: 7.4 % (ref 4.2–6.3)
HBA1C MFR BLD: 7.5 % (ref 4.2–6.3)
HBA1C MFR BLD: 7.8 % (ref 4.2–6.3)
HCO3 ARTERIAL: 23.7 MMOL/L (ref 18–23)
HCO3 ARTERIAL: 24.3 MMOL/L (ref 18–23)
HCO3 VENOUS: 24.9 MMOL/L (ref 19–25)
HCO3 VENOUS: 26 MMOL/L (ref 19–25)
HCT VFR BLD CALC: 21.3 % (ref 37–47)
HCT VFR BLD CALC: 21.4 % (ref 37–47)
HCT VFR BLD CALC: 21.6 % (ref 37–47)
HCT VFR BLD CALC: 26.2 % (ref 37–47)
HCT VFR BLD CALC: 26.7 % (ref 37–47)
HCT VFR BLD CALC: 27 % (ref 37–47)
HCT VFR BLD CALC: 27.3 % (ref 37–47)
HCT VFR BLD CALC: 27.7 % (ref 37–47)
HCT VFR BLD CALC: 29.1 % (ref 37–47)
HCT VFR BLD CALC: 29.8 % (ref 37–47)
HCT VFR BLD CALC: 29.8 % (ref 37–47)
HCT VFR BLD CALC: 30.2 % (ref 37–47)
HCT VFR BLD CALC: 31.4 % (ref 37–47)
HCT VFR BLD CALC: 31.8 % (ref 37–47)
HCT VFR BLD CALC: 31.9 % (ref 37–47)
HCT VFR BLD CALC: 32.3 % (ref 37–47)
HCT VFR BLD CALC: 32.3 % (ref 37–47)
HCT VFR BLD CALC: 32.5 % (ref 37–47)
HCT VFR BLD CALC: 33.4 % (ref 37–47)
HCT VFR BLD CALC: 33.9 % (ref 37–47)
HCT VFR BLD CALC: 34.1 % (ref 37–47)
HCT VFR BLD CALC: 34.6 % (ref 37–47)
HCT VFR BLD CALC: 37.5 % (ref 37–47)
HDLC SERPL-MCNC: 135 MG/DL
HEMOGLOBIN: 10 GM/DL (ref 12.5–16)
HEMOGLOBIN: 10.2 GM/DL (ref 12.5–16)
HEMOGLOBIN: 10.2 GM/DL (ref 12.5–16)
HEMOGLOBIN: 10.3 GM/DL (ref 12.5–16)
HEMOGLOBIN: 10.6 GM/DL (ref 12.5–16)
HEMOGLOBIN: 10.6 GM/DL (ref 12.5–16)
HEMOGLOBIN: 10.7 GM/DL (ref 12.5–16)
HEMOGLOBIN: 11.7 GM/DL (ref 12.5–16)
HEMOGLOBIN: 7.9 GM/DL (ref 12.5–16)
HEMOGLOBIN: 8.2 GM/DL (ref 12.5–16)
HEMOGLOBIN: 8.3 GM/DL (ref 12.5–16)
HEMOGLOBIN: 8.4 GM/DL (ref 12.5–16)
HEMOGLOBIN: 8.6 GM/DL (ref 12.5–16)
HEMOGLOBIN: 9.2 GM/DL (ref 12.5–16)
HEMOGLOBIN: 9.2 GM/DL (ref 12.5–16)
HEMOGLOBIN: 9.4 GM/DL (ref 12.5–16)
HEMOGLOBIN: 9.4 GM/DL (ref 12.5–16)
HEMOGLOBIN: 9.7 GM/DL (ref 12.5–16)
HEMOGLOBIN: 9.7 GM/DL (ref 12.5–16)
HEMOGLOBIN: 9.8 GM/DL (ref 12.5–16)
HEMOGLOBIN: ABNORMAL GM/DL (ref 12.5–16)
HIGH SENSITIVE C-REACTIVE PROTEIN: 168.1 MG/L
HIGH SENSITIVE C-REACTIVE PROTEIN: >300 MG/L
HUMAN METAPNEUMOVIRUS PCR: NOT DETECTED
HYALINE CASTS: 5 /LPF
IMMATURE NEUTROPHIL %: 0.2 % (ref 0–0.43)
IMMATURE NEUTROPHIL %: 0.4 % (ref 0–0.43)
IMMATURE NEUTROPHIL %: 0.5 % (ref 0–0.43)
IMMATURE NEUTROPHIL %: 0.8 % (ref 0–0.43)
IMMATURE NEUTROPHIL %: 1.3 % (ref 0–0.43)
INFLUENZA A BY PCR: NOT DETECTED
INFLUENZA A H1 (2009) PCR: NOT DETECTED
INFLUENZA A H1 PANDEMIC PCR: NOT DETECTED
INFLUENZA A H3 PCR: NOT DETECTED
INFLUENZA B BY PCR: NOT DETECTED
INR BLD: 0.83 INDEX
INR BLD: 0.98 INDEX
INR BLD: 1 INDEX
INR BLD: 1.08 INDEX
KETONES, URINE: ABNORMAL MG/DL
KETONES, URINE: NEGATIVE MG/DL
KETONES, URINE: NEGATIVE MG/DL
LACTATE DEHYDROGENASE, FLUID: 88 IU/L
LACTATE: 0.6 MMOL/L (ref 0.4–2)
LACTATE: 0.7 MMOL/L (ref 0.4–2)
LDL CHOLESTEROL CALCULATED: 71 MG/DL
LEGIONELLA URINARY AG: NEGATIVE
LEUKOCYTE ESTERASE, URINE: ABNORMAL
LEUKOCYTE ESTERASE, URINE: ABNORMAL
LEUKOCYTE ESTERASE, URINE: NEGATIVE
LIPASE: 26 IU/L (ref 13–60)
LIPASE: 45 IU/L (ref 13–60)
LV EF: 58 %
LV EF: 60 %
LVEF MODALITY: NORMAL
LVEF MODALITY: NORMAL
LYMPHOCYTES ABSOLUTE: 0.1 K/CU MM
LYMPHOCYTES ABSOLUTE: 0.2 K/CU MM
LYMPHOCYTES ABSOLUTE: 0.3 K/CU MM
LYMPHOCYTES ABSOLUTE: 0.5 K/CU MM
LYMPHOCYTES ABSOLUTE: 0.6 K/CU MM
LYMPHOCYTES ABSOLUTE: 0.7 K/CU MM
LYMPHOCYTES RELATIVE PERCENT: 1 % (ref 24–44)
LYMPHOCYTES RELATIVE PERCENT: 1.6 % (ref 24–44)
LYMPHOCYTES RELATIVE PERCENT: 10.7 % (ref 24–44)
LYMPHOCYTES RELATIVE PERCENT: 11.8 % (ref 24–44)
LYMPHOCYTES RELATIVE PERCENT: 14.6 % (ref 24–44)
LYMPHOCYTES RELATIVE PERCENT: 15.4 % (ref 24–44)
LYMPHOCYTES RELATIVE PERCENT: 2 % (ref 24–44)
LYMPHOCYTES RELATIVE PERCENT: 5.8 % (ref 24–44)
LYMPHOCYTES RELATIVE PERCENT: 7.3 % (ref 24–44)
LYMPHOCYTES, BODY FLUID: 58 %
Lab: 24 HRS
Lab: ABNORMAL
Lab: NORMAL
MAGNESIUM: 2.2 MG/DL (ref 1.8–2.4)
MCH RBC QN AUTO: 28.6 PG (ref 27–31)
MCH RBC QN AUTO: 28.7 PG (ref 27–31)
MCH RBC QN AUTO: 28.8 PG (ref 27–31)
MCH RBC QN AUTO: 28.9 PG (ref 27–31)
MCH RBC QN AUTO: 29 PG (ref 27–31)
MCH RBC QN AUTO: 29.1 PG (ref 27–31)
MCH RBC QN AUTO: 29.2 PG (ref 27–31)
MCH RBC QN AUTO: 29.2 PG (ref 27–31)
MCH RBC QN AUTO: 29.3 PG (ref 27–31)
MCH RBC QN AUTO: 29.4 PG (ref 27–31)
MCH RBC QN AUTO: 29.5 PG (ref 27–31)
MCH RBC QN AUTO: 29.6 PG (ref 27–31)
MCH RBC QN AUTO: 29.7 PG (ref 27–31)
MCH RBC QN AUTO: 29.7 PG (ref 27–31)
MCHC RBC AUTO-ENTMCNC: 29.2 % (ref 32–36)
MCHC RBC AUTO-ENTMCNC: 30 % (ref 32–36)
MCHC RBC AUTO-ENTMCNC: 30 % (ref 32–36)
MCHC RBC AUTO-ENTMCNC: 30.2 % (ref 32–36)
MCHC RBC AUTO-ENTMCNC: 30.5 % (ref 32–36)
MCHC RBC AUTO-ENTMCNC: 30.5 % (ref 32–36)
MCHC RBC AUTO-ENTMCNC: 30.7 % (ref 32–36)
MCHC RBC AUTO-ENTMCNC: 30.7 % (ref 32–36)
MCHC RBC AUTO-ENTMCNC: 30.9 % (ref 32–36)
MCHC RBC AUTO-ENTMCNC: 31 % (ref 32–36)
MCHC RBC AUTO-ENTMCNC: 31.1 % (ref 32–36)
MCHC RBC AUTO-ENTMCNC: 31.1 % (ref 32–36)
MCHC RBC AUTO-ENTMCNC: 31.2 % (ref 32–36)
MCHC RBC AUTO-ENTMCNC: 31.3 % (ref 32–36)
MCHC RBC AUTO-ENTMCNC: 31.4 % (ref 32–36)
MCHC RBC AUTO-ENTMCNC: 31.5 % (ref 32–36)
MCHC RBC AUTO-ENTMCNC: 31.5 % (ref 32–36)
MCHC RBC AUTO-ENTMCNC: 31.6 % (ref 32–36)
MCHC RBC AUTO-ENTMCNC: 31.9 % (ref 32–36)
MCV RBC AUTO: 92.3 FL (ref 78–100)
MCV RBC AUTO: 92.8 FL (ref 78–100)
MCV RBC AUTO: 93.3 FL (ref 78–100)
MCV RBC AUTO: 93.6 FL (ref 78–100)
MCV RBC AUTO: 93.8 FL (ref 78–100)
MCV RBC AUTO: 93.8 FL (ref 78–100)
MCV RBC AUTO: 93.9 FL (ref 78–100)
MCV RBC AUTO: 94.1 FL (ref 78–100)
MCV RBC AUTO: 94.4 FL (ref 78–100)
MCV RBC AUTO: 94.5 FL (ref 78–100)
MCV RBC AUTO: 94.7 FL (ref 78–100)
MCV RBC AUTO: 95.3 FL (ref 78–100)
MCV RBC AUTO: 95.4 FL (ref 78–100)
MCV RBC AUTO: 95.7 FL (ref 78–100)
MCV RBC AUTO: 95.8 FL (ref 78–100)
MCV RBC AUTO: 96 FL (ref 78–100)
MCV RBC AUTO: 96.1 FL (ref 78–100)
MCV RBC AUTO: 97.7 FL (ref 78–100)
MCV RBC AUTO: 98.2 FL (ref 78–100)
MESOTHELIAL FLUID: 7 /100 WBC
METHEMOGLOBIN ARTERIAL: 0 %
METHEMOGLOBIN ARTERIAL: 0.5 %
MICROCYTES: ABNORMAL
MONOCYTE, FLUID: 24 %
MONOCYTES ABSOLUTE: 0.3 K/CU MM
MONOCYTES ABSOLUTE: 0.4 K/CU MM
MONOCYTES ABSOLUTE: 0.5 K/CU MM
MONOCYTES ABSOLUTE: 0.6 K/CU MM
MONOCYTES ABSOLUTE: 0.8 K/CU MM
MONOCYTES RELATIVE PERCENT: 5 % (ref 0–4)
MONOCYTES RELATIVE PERCENT: 5.7 % (ref 0–4)
MONOCYTES RELATIVE PERCENT: 5.7 % (ref 0–4)
MONOCYTES RELATIVE PERCENT: 6 % (ref 0–4)
MONOCYTES RELATIVE PERCENT: 6.1 % (ref 0–4)
MONOCYTES RELATIVE PERCENT: 6.4 % (ref 0–4)
MONOCYTES RELATIVE PERCENT: 6.4 % (ref 0–4)
MONOCYTES RELATIVE PERCENT: 7.6 % (ref 0–4)
MONOCYTES RELATIVE PERCENT: 7.9 % (ref 0–4)
MUCUS: ABNORMAL HPF
MYCOPLASMA PNEUMONIAE PCR: NOT DETECTED
MYCOPLASMA PNEUMONIAE PCR: NOT DETECTED
NEUTROPHIL, FLUID: 18 %
NITRITE URINE, QUANTITATIVE: NEGATIVE
NUCLEATED RBC %: 0 %
O2 SAT, VEN: 82.5 % (ref 50–70)
O2 SAT, VEN: 94.1 % (ref 50–70)
O2 SATURATION: 93.8 % (ref 96–97)
O2 SATURATION: 94.2 % (ref 96–97)
ORGANISM: NORMAL
ORGANISM: NORMAL
PARAINFLUENZA 1 PCR: NOT DETECTED
PARAINFLUENZA 2 PCR: NOT DETECTED
PARAINFLUENZA 3 PCR: NOT DETECTED
PARAINFLUENZA 4 PCR: NOT DETECTED
PCO2 ARTERIAL: 41 MMHG (ref 32–45)
PCO2 ARTERIAL: 42 MMHG (ref 32–45)
PCO2, VEN: 43 MMHG (ref 38–52)
PCO2, VEN: 58 MMHG (ref 38–52)
PDW BLD-RTO: 13.5 % (ref 11.7–14.9)
PDW BLD-RTO: 13.5 % (ref 11.7–14.9)
PDW BLD-RTO: 13.7 % (ref 11.7–14.9)
PDW BLD-RTO: 14.1 % (ref 11.7–14.9)
PDW BLD-RTO: 14.2 % (ref 11.7–14.9)
PDW BLD-RTO: 14.3 % (ref 11.7–14.9)
PDW BLD-RTO: 14.3 % (ref 11.7–14.9)
PDW BLD-RTO: 14.4 % (ref 11.7–14.9)
PDW BLD-RTO: 14.4 % (ref 11.7–14.9)
PDW BLD-RTO: 14.5 % (ref 11.7–14.9)
PDW BLD-RTO: 14.5 % (ref 11.7–14.9)
PDW BLD-RTO: 14.6 % (ref 11.7–14.9)
PDW BLD-RTO: 14.6 % (ref 11.7–14.9)
PDW BLD-RTO: 15.4 % (ref 11.7–14.9)
PDW BLD-RTO: 15.5 % (ref 11.7–14.9)
PDW BLD-RTO: 16 % (ref 11.7–14.9)
PDW BLD-RTO: 16.2 % (ref 11.7–14.9)
PDW BLD-RTO: 16.6 % (ref 11.7–14.9)
PDW BLD-RTO: 16.6 % (ref 11.7–14.9)
PH BLOOD: 7.37 (ref 7.34–7.45)
PH BLOOD: 7.37 (ref 7.34–7.45)
PH VENOUS: 7.24 (ref 7.32–7.42)
PH VENOUS: 7.39 (ref 7.32–7.42)
PH, URINE: 5 (ref 5–8)
PH, URINE: 5 (ref 5–8)
PH, URINE: 6 (ref 5–8)
PHOSPHORUS: 2.1 MG/DL (ref 2.5–4.9)
PHOSPHORUS: 2.2 MG/DL (ref 2.5–4.9)
PHOSPHORUS: 2.2 MG/DL (ref 2.5–4.9)
PHOSPHORUS: 3.8 MG/DL (ref 2.5–4.9)
PHOSPHORUS: 4.8 MG/DL (ref 2.5–4.9)
PLATELET # BLD: 112 K/CU MM (ref 140–440)
PLATELET # BLD: 121 K/CU MM (ref 140–440)
PLATELET # BLD: 127 K/CU MM (ref 140–440)
PLATELET # BLD: 127 K/CU MM (ref 140–440)
PLATELET # BLD: 135 K/CU MM (ref 140–440)
PLATELET # BLD: 143 K/CU MM (ref 140–440)
PLATELET # BLD: 160 K/CU MM (ref 140–440)
PLATELET # BLD: 166 K/CU MM (ref 140–440)
PLATELET # BLD: 168 K/CU MM (ref 140–440)
PLATELET # BLD: 171 K/CU MM (ref 140–440)
PLATELET # BLD: 176 K/CU MM (ref 140–440)
PLATELET # BLD: 178 K/CU MM (ref 140–440)
PLATELET # BLD: 184 K/CU MM (ref 140–440)
PLATELET # BLD: 188 K/CU MM (ref 140–440)
PLATELET # BLD: 202 K/CU MM (ref 140–440)
PLATELET # BLD: 207 K/CU MM (ref 140–440)
PLATELET # BLD: 249 K/CU MM (ref 140–440)
PLATELET # BLD: 250 K/CU MM (ref 140–440)
PLATELET # BLD: 262 K/CU MM (ref 140–440)
PLATELET # BLD: 262 K/CU MM (ref 140–440)
PLATELET # BLD: ABNORMAL K/CU MM (ref 140–440)
PLT MORPHOLOGY: ABNORMAL
PMV BLD AUTO: 10.1 FL (ref 7.5–11.1)
PMV BLD AUTO: 10.2 FL (ref 7.5–11.1)
PMV BLD AUTO: 10.2 FL (ref 7.5–11.1)
PMV BLD AUTO: 10.3 FL (ref 7.5–11.1)
PMV BLD AUTO: 10.4 FL (ref 7.5–11.1)
PMV BLD AUTO: 10.4 FL (ref 7.5–11.1)
PMV BLD AUTO: 10.5 FL (ref 7.5–11.1)
PMV BLD AUTO: 10.5 FL (ref 7.5–11.1)
PMV BLD AUTO: 10.6 FL (ref 7.5–11.1)
PMV BLD AUTO: 10.7 FL (ref 7.5–11.1)
PMV BLD AUTO: 10.7 FL (ref 7.5–11.1)
PMV BLD AUTO: 10.8 FL (ref 7.5–11.1)
PMV BLD AUTO: 10.9 FL (ref 7.5–11.1)
PMV BLD AUTO: 9.5 FL (ref 7.5–11.1)
PMV BLD AUTO: 9.7 FL (ref 7.5–11.1)
PO2 ARTERIAL: 80 MMHG (ref 75–100)
PO2 ARTERIAL: 85 MMHG (ref 75–100)
PO2, VEN: 125 MMHG (ref 28–48)
PO2, VEN: 49 MMHG (ref 28–48)
POLYCHROMASIA: ABNORMAL
POTASSIUM SERPL-SCNC: 3.3 MMOL/L (ref 3.5–5.1)
POTASSIUM SERPL-SCNC: 3.5 MMOL/L (ref 3.5–5.1)
POTASSIUM SERPL-SCNC: 3.7 MMOL/L (ref 3.5–5.1)
POTASSIUM SERPL-SCNC: 4 MMOL/L (ref 3.5–5.1)
POTASSIUM SERPL-SCNC: 4.1 MMOL/L (ref 3.5–5.1)
POTASSIUM SERPL-SCNC: 4.3 MMOL/L (ref 3.5–5.1)
POTASSIUM SERPL-SCNC: 4.4 MMOL/L (ref 3.5–5.1)
POTASSIUM SERPL-SCNC: 4.5 MMOL/L (ref 3.5–5.1)
POTASSIUM SERPL-SCNC: 4.6 MMOL/L (ref 3.5–5.1)
POTASSIUM SERPL-SCNC: 4.6 MMOL/L (ref 3.5–5.1)
POTASSIUM SERPL-SCNC: 4.7 MMOL/L (ref 3.5–5.1)
POTASSIUM SERPL-SCNC: 4.8 MMOL/L (ref 3.5–5.1)
POTASSIUM SERPL-SCNC: 4.8 MMOL/L (ref 3.5–5.1)
POTASSIUM SERPL-SCNC: 4.9 MMOL/L (ref 3.5–5.1)
POTASSIUM SERPL-SCNC: 4.9 MMOL/L (ref 3.5–5.1)
POTASSIUM SERPL-SCNC: 5.1 MMOL/L (ref 3.5–5.1)
POTASSIUM SERPL-SCNC: 5.3 MMOL/L (ref 3.5–5.1)
POTASSIUM SERPL-SCNC: 5.3 MMOL/L (ref 3.5–5.1)
POTASSIUM SERPL-SCNC: 5.5 MMOL/L (ref 3.5–5.1)
POTASSIUM SERPL-SCNC: 5.6 MMOL/L (ref 3.5–5.1)
POTASSIUM SERPL-SCNC: 5.7 MMOL/L (ref 3.5–5.1)
POTASSIUM SERPL-SCNC: 6 MMOL/L (ref 3.5–5.1)
PRO-BNP: 1494 PG/ML
PRO-BNP: 184.5 PG/ML
PRO-BNP: 5077 PG/ML
PROCALCITONIN: 0.78
PROCALCITONIN: 0.78
PROT/CREAT RATIO, UR: ABNORMAL
PROTEIN 24 HOUR URINE: 2502 MG/24 HR
PROTEIN FLUID: NORMAL G/DL
PROTEIN UA: 100 MG/DL
PROTEIN UA: >500 MG/DL
PROTEIN UA: >500 MG/DL
PROTHROMBIN TIME: 11.2 SECONDS (ref 9.12–12.5)
PROTHROMBIN TIME: 11.6 SECONDS (ref 9.12–12.5)
PROTHROMBIN TIME: 12.3 SECONDS (ref 9.12–12.5)
PROTHROMBIN TIME: 9.7 SECONDS (ref 9.12–12.5)
RAPID INFLUENZA  B AGN: NEGATIVE
RAPID INFLUENZA A AGN: NEGATIVE
RBC # BLD: 2.18 M/CU MM (ref 4.2–5.4)
RBC # BLD: 2.2 M/CU MM (ref 4.2–5.4)
RBC # BLD: 2.73 M/CU MM (ref 4.2–5.4)
RBC # BLD: 2.82 M/CU MM (ref 4.2–5.4)
RBC # BLD: 2.88 M/CU MM (ref 4.2–5.4)
RBC # BLD: 2.89 M/CU MM (ref 4.2–5.4)
RBC # BLD: 2.9 M/CU MM (ref 4.2–5.4)
RBC # BLD: 3.1 M/CU MM (ref 4.2–5.4)
RBC # BLD: 3.12 M/CU MM (ref 4.2–5.4)
RBC # BLD: 3.19 M/CU MM (ref 4.2–5.4)
RBC # BLD: 3.23 M/CU MM (ref 4.2–5.4)
RBC # BLD: 3.29 M/CU MM (ref 4.2–5.4)
RBC # BLD: 3.37 M/CU MM (ref 4.2–5.4)
RBC # BLD: 3.37 M/CU MM (ref 4.2–5.4)
RBC # BLD: 3.39 M/CU MM (ref 4.2–5.4)
RBC # BLD: 3.46 M/CU MM (ref 4.2–5.4)
RBC # BLD: 3.48 M/CU MM (ref 4.2–5.4)
RBC # BLD: 3.49 M/CU MM (ref 4.2–5.4)
RBC # BLD: 3.62 M/CU MM (ref 4.2–5.4)
RBC # BLD: 3.69 M/CU MM (ref 4.2–5.4)
RBC # BLD: 3.97 M/CU MM (ref 4.2–5.4)
RBC FLUID: 484 /CU MM
RBC URINE: 1 /HPF (ref 0–6)
RBC URINE: ABNORMAL /HPF (ref 0–6)
RBC URINE: ABNORMAL /HPF (ref 0–6)
REPORT STATUS: NORMAL
REPORT STATUS: NORMAL
RHINOVIRUS ENTEROVIRUS PCR: NOT DETECTED
RSV PCR: NOT DETECTED
SEGMENTED NEUTROPHILS ABSOLUTE COUNT: 11.3 K/CU MM
SEGMENTED NEUTROPHILS ABSOLUTE COUNT: 11.9 K/CU MM
SEGMENTED NEUTROPHILS ABSOLUTE COUNT: 2.5 K/CU MM
SEGMENTED NEUTROPHILS ABSOLUTE COUNT: 3.4 K/CU MM
SEGMENTED NEUTROPHILS ABSOLUTE COUNT: 3.5 K/CU MM
SEGMENTED NEUTROPHILS ABSOLUTE COUNT: 4.5 K/CU MM
SEGMENTED NEUTROPHILS ABSOLUTE COUNT: 6.9 K/CU MM
SEGMENTED NEUTROPHILS ABSOLUTE COUNT: 7.9 K/CU MM
SEGMENTED NEUTROPHILS ABSOLUTE COUNT: 9.6 K/CU MM
SEGMENTED NEUTROPHILS RELATIVE PERCENT: 71.4 % (ref 36–66)
SEGMENTED NEUTROPHILS RELATIVE PERCENT: 73 % (ref 36–66)
SEGMENTED NEUTROPHILS RELATIVE PERCENT: 73 % (ref 36–66)
SEGMENTED NEUTROPHILS RELATIVE PERCENT: 74.8 % (ref 36–66)
SEGMENTED NEUTROPHILS RELATIVE PERCENT: 75.3 % (ref 36–66)
SEGMENTED NEUTROPHILS RELATIVE PERCENT: 82.4 % (ref 36–66)
SEGMENTED NEUTROPHILS RELATIVE PERCENT: 83.2 % (ref 36–66)
SEGMENTED NEUTROPHILS RELATIVE PERCENT: 85.1 % (ref 36–66)
SEGMENTED NEUTROPHILS RELATIVE PERCENT: 91.2 % (ref 36–66)
SODIUM BLD-SCNC: 133 MMOL/L (ref 135–145)
SODIUM BLD-SCNC: 136 MMOL/L (ref 135–145)
SODIUM BLD-SCNC: 136 MMOL/L (ref 135–145)
SODIUM BLD-SCNC: 138 MMOL/L (ref 135–145)
SODIUM BLD-SCNC: 138 MMOL/L (ref 135–145)
SODIUM BLD-SCNC: 139 MMOL/L (ref 135–145)
SODIUM BLD-SCNC: 139 MMOL/L (ref 135–145)
SODIUM BLD-SCNC: 140 MMOL/L (ref 135–145)
SODIUM BLD-SCNC: 141 MMOL/L (ref 135–145)
SODIUM BLD-SCNC: 142 MMOL/L (ref 135–145)
SODIUM BLD-SCNC: 143 MMOL/L (ref 135–145)
SODIUM BLD-SCNC: 143 MMOL/L (ref 135–145)
SODIUM BLD-SCNC: 144 MMOL/L (ref 135–145)
SODIUM BLD-SCNC: 144 MMOL/L (ref 135–145)
SODIUM BLD-SCNC: 145 MMOL/L (ref 135–145)
SODIUM BLD-SCNC: 145 MMOL/L (ref 135–145)
SODIUM BLD-SCNC: 146 MMOL/L (ref 135–145)
SODIUM BLD-SCNC: 148 MMOL/L (ref 135–145)
SODIUM BLD-SCNC: 150 MMOL/L (ref 135–145)
SODIUM BLD-SCNC: 151 MMOL/L (ref 135–145)
SODIUM BLD-SCNC: 151 MMOL/L (ref 135–145)
SODIUM BLD-SCNC: 154 MMOL/L (ref 135–145)
SPECIFIC GRAVITY UA: 1.01 (ref 1–1.03)
SPECIMEN: ABNORMAL
SPECIMEN: NORMAL
SQUAMOUS EPITHELIAL: <1 /HPF
STATUS: NORMAL
STATUS: NORMAL
STREP PNEUMONIAE ANTIGEN: NORMAL
TOTAL CK: 35 IU/L (ref 26–140)
TOTAL CK: 96 IU/L (ref 26–140)
TOTAL COLONY COUNT: NORMAL
TOTAL COLONY COUNT: NORMAL
TOTAL IMMATURE NEUTOROPHIL: 0.01 K/CU MM
TOTAL IMMATURE NEUTOROPHIL: 0.01 K/CU MM
TOTAL IMMATURE NEUTOROPHIL: 0.02 K/CU MM
TOTAL IMMATURE NEUTOROPHIL: 0.02 K/CU MM
TOTAL IMMATURE NEUTOROPHIL: 0.03 K/CU MM
TOTAL IMMATURE NEUTOROPHIL: 0.07 K/CU MM
TOTAL IMMATURE NEUTOROPHIL: 0.12 K/CU MM
TOTAL NUCLEATED RBC: 0 K/CU MM
TOTAL PROTEIN: 4.7 GM/DL (ref 6.4–8.2)
TOTAL PROTEIN: 4.7 GM/DL (ref 6.4–8.2)
TOTAL PROTEIN: 4.8 GM/DL (ref 6.4–8.2)
TOTAL PROTEIN: 5 GM/DL (ref 6.4–8.2)
TOTAL PROTEIN: 5.3 GM/DL (ref 6.4–8.2)
TOTAL PROTEIN: 5.6 GM/DL (ref 6.4–8.2)
TOTAL PROTEIN: 6.5 GM/DL (ref 6.4–8.2)
TOTAL PROTEIN: 7 GM/DL (ref 6.4–8.2)
TRANSFUSION STATUS: NORMAL
TRANSFUSION STATUS: NORMAL
TRICHOMONAS: ABNORMAL /HPF
TRIGL SERPL-MCNC: 88 MG/DL
TROPONIN T: 0.02 NG/ML
TROPONIN T: 0.04 NG/ML
TSH HIGH SENSITIVITY: 1.29 UIU/ML (ref 0.27–4.2)
TSH HIGH SENSITIVITY: 1.62 UIU/ML (ref 0.27–4.2)
UNIT DIVISION: 0
UNIT DIVISION: 0
UNIT NUMBER: NORMAL
UNIT NUMBER: NORMAL
URINE TOTAL PROTEIN: 442.6 MG/DL
UROBILINOGEN, URINE: NORMAL MG/DL (ref 0.2–1)
VANCOMYCIN RANDOM: 10.6 UG/ML
VANCOMYCIN RANDOM: 16.6 UG/ML
VANCOMYCIN RANDOM: 16.7 UG/ML
VANCOMYCIN RANDOM: 17.2 UG/ML
VANCOMYCIN RANDOM: 20.2 UG/ML
VANCOMYCIN RANDOM: 20.3 UG/ML
VANCOMYCIN RANDOM: 23.8 UG/ML
VANCOMYCIN TROUGH: 21.3 UG/ML (ref 10–20)
VOLUME, (UVOL): 800 MLS
WBC # BLD: 11.7 K/CU MM (ref 4–10.5)
WBC # BLD: 13.1 K/CU MM (ref 4–10.5)
WBC # BLD: 13.1 K/CU MM (ref 4–10.5)
WBC # BLD: 15.5 K/CU MM (ref 4–10.5)
WBC # BLD: 3.2 K/CU MM (ref 4–10.5)
WBC # BLD: 3.6 K/CU MM (ref 4–10.5)
WBC # BLD: 3.8 K/CU MM (ref 4–10.5)
WBC # BLD: 4.4 K/CU MM (ref 4–10.5)
WBC # BLD: 4.5 K/CU MM (ref 4–10.5)
WBC # BLD: 4.6 K/CU MM (ref 4–10.5)
WBC # BLD: 5.1 K/CU MM (ref 4–10.5)
WBC # BLD: 5.4 K/CU MM (ref 4–10.5)
WBC # BLD: 5.4 K/CU MM (ref 4–10.5)
WBC # BLD: 6.1 K/CU MM (ref 4–10.5)
WBC # BLD: 6.6 K/CU MM (ref 4–10.5)
WBC # BLD: 7.1 K/CU MM (ref 4–10.5)
WBC # BLD: 7.6 K/CU MM (ref 4–10.5)
WBC # BLD: 8.1 K/CU MM (ref 4–10.5)
WBC # BLD: 9.1 K/CU MM (ref 4–10.5)
WBC # BLD: 9.2 K/CU MM (ref 4–10.5)
WBC # BLD: 9.5 K/CU MM (ref 4–10.5)
WBC CLUMP: ABNORMAL /HPF
WBC FLUID: 100 /CU MM
WBC UA: 1 /HPF (ref 0–5)
WBC UA: 1613 /HPF (ref 0–5)
WBC UA: 42 /HPF (ref 0–5)

## 2019-01-01 PROCEDURE — 2060000000 HC ICU INTERMEDIATE R&B

## 2019-01-01 PROCEDURE — 97110 THERAPEUTIC EXERCISES: CPT

## 2019-01-01 PROCEDURE — 82550 ASSAY OF CK (CPK): CPT

## 2019-01-01 PROCEDURE — 82962 GLUCOSE BLOOD TEST: CPT

## 2019-01-01 PROCEDURE — 84145 PROCALCITONIN (PCT): CPT

## 2019-01-01 PROCEDURE — 6360000002 HC RX W HCPCS: Performed by: INTERNAL MEDICINE

## 2019-01-01 PROCEDURE — 2580000003 HC RX 258

## 2019-01-01 PROCEDURE — 99232 SBSQ HOSP IP/OBS MODERATE 35: CPT | Performed by: INTERNAL MEDICINE

## 2019-01-01 PROCEDURE — 2580000003 HC RX 258: Performed by: INTERNAL MEDICINE

## 2019-01-01 PROCEDURE — 87071 CULTURE AEROBIC QUANT OTHER: CPT

## 2019-01-01 PROCEDURE — 84100 ASSAY OF PHOSPHORUS: CPT

## 2019-01-01 PROCEDURE — 99233 SBSQ HOSP IP/OBS HIGH 50: CPT | Performed by: INTERNAL MEDICINE

## 2019-01-01 PROCEDURE — 6370000000 HC RX 637 (ALT 250 FOR IP): Performed by: INTERNAL MEDICINE

## 2019-01-01 PROCEDURE — 94640 AIRWAY INHALATION TREATMENT: CPT

## 2019-01-01 PROCEDURE — 80048 BASIC METABOLIC PNL TOTAL CA: CPT

## 2019-01-01 PROCEDURE — 85025 COMPLETE CBC W/AUTO DIFF WBC: CPT

## 2019-01-01 PROCEDURE — A9570 INDIUM IN-111 AUTO WBC: HCPCS | Performed by: INTERNAL MEDICINE

## 2019-01-01 PROCEDURE — 83605 ASSAY OF LACTIC ACID: CPT

## 2019-01-01 PROCEDURE — 51798 US URINE CAPACITY MEASURE: CPT

## 2019-01-01 PROCEDURE — 73140 X-RAY EXAM OF FINGER(S): CPT

## 2019-01-01 PROCEDURE — 71045 X-RAY EXAM CHEST 1 VIEW: CPT

## 2019-01-01 PROCEDURE — 72148 MRI LUMBAR SPINE W/O DYE: CPT

## 2019-01-01 PROCEDURE — 85027 COMPLETE CBC AUTOMATED: CPT

## 2019-01-01 PROCEDURE — 96366 THER/PROPH/DIAG IV INF ADDON: CPT

## 2019-01-01 PROCEDURE — 70450 CT HEAD/BRAIN W/O DYE: CPT

## 2019-01-01 PROCEDURE — 97530 THERAPEUTIC ACTIVITIES: CPT

## 2019-01-01 PROCEDURE — 96365 THER/PROPH/DIAG IV INF INIT: CPT

## 2019-01-01 PROCEDURE — C1729 CATH, DRAINAGE: HCPCS

## 2019-01-01 PROCEDURE — 80053 COMPREHEN METABOLIC PANEL: CPT

## 2019-01-01 PROCEDURE — 82803 BLOOD GASES ANY COMBINATION: CPT

## 2019-01-01 PROCEDURE — 94761 N-INVAS EAR/PLS OXIMETRY MLT: CPT

## 2019-01-01 PROCEDURE — 86850 RBC ANTIBODY SCREEN: CPT

## 2019-01-01 PROCEDURE — 87086 URINE CULTURE/COLONY COUNT: CPT

## 2019-01-01 PROCEDURE — 36415 COLL VENOUS BLD VENIPUNCTURE: CPT

## 2019-01-01 PROCEDURE — 85007 BL SMEAR W/DIFF WBC COUNT: CPT

## 2019-01-01 PROCEDURE — 2500000003 HC RX 250 WO HCPCS: Performed by: NURSE ANESTHETIST, CERTIFIED REGISTERED

## 2019-01-01 PROCEDURE — 81001 URINALYSIS AUTO W/SCOPE: CPT

## 2019-01-01 PROCEDURE — 72146 MRI CHEST SPINE W/O DYE: CPT

## 2019-01-01 PROCEDURE — 86141 C-REACTIVE PROTEIN HS: CPT

## 2019-01-01 PROCEDURE — 87073 CULTURE BACTERIA ANAEROBIC: CPT

## 2019-01-01 PROCEDURE — 80069 RENAL FUNCTION PANEL: CPT

## 2019-01-01 PROCEDURE — 74176 CT ABD & PELVIS W/O CONTRAST: CPT

## 2019-01-01 PROCEDURE — 2580000003 HC RX 258: Performed by: FAMILY MEDICINE

## 2019-01-01 PROCEDURE — 76937 US GUIDE VASCULAR ACCESS: CPT

## 2019-01-01 PROCEDURE — 2700000000 HC OXYGEN THERAPY PER DAY

## 2019-01-01 PROCEDURE — 87040 BLOOD CULTURE FOR BACTERIA: CPT

## 2019-01-01 PROCEDURE — 99222 1ST HOSP IP/OBS MODERATE 55: CPT | Performed by: NURSE PRACTITIONER

## 2019-01-01 PROCEDURE — 80202 ASSAY OF VANCOMYCIN: CPT

## 2019-01-01 PROCEDURE — 2580000003 HC RX 258: Performed by: EMERGENCY MEDICINE

## 2019-01-01 PROCEDURE — 85610 PROTHROMBIN TIME: CPT

## 2019-01-01 PROCEDURE — 94150 VITAL CAPACITY TEST: CPT

## 2019-01-01 PROCEDURE — 70496 CT ANGIOGRAPHY HEAD: CPT

## 2019-01-01 PROCEDURE — 84443 ASSAY THYROID STIM HORMONE: CPT

## 2019-01-01 PROCEDURE — 87186 SC STD MICRODIL/AGAR DIL: CPT

## 2019-01-01 PROCEDURE — 97535 SELF CARE MNGMENT TRAINING: CPT

## 2019-01-01 PROCEDURE — 87147 CULTURE TYPE IMMUNOLOGIC: CPT

## 2019-01-01 PROCEDURE — 96361 HYDRATE IV INFUSION ADD-ON: CPT

## 2019-01-01 PROCEDURE — 87077 CULTURE AEROBIC IDENTIFY: CPT

## 2019-01-01 PROCEDURE — 3430000000 HC RX DIAGNOSTIC RADIOPHARMACEUTICAL: Performed by: INTERNAL MEDICINE

## 2019-01-01 PROCEDURE — 84484 ASSAY OF TROPONIN QUANT: CPT

## 2019-01-01 PROCEDURE — 2500000003 HC RX 250 WO HCPCS: Performed by: INTERNAL MEDICINE

## 2019-01-01 PROCEDURE — 88305 TISSUE EXAM BY PATHOLOGIST: CPT

## 2019-01-01 PROCEDURE — 97163 PT EVAL HIGH COMPLEX 45 MIN: CPT

## 2019-01-01 PROCEDURE — 6370000000 HC RX 637 (ALT 250 FOR IP): Performed by: SURGERY

## 2019-01-01 PROCEDURE — 83690 ASSAY OF LIPASE: CPT

## 2019-01-01 PROCEDURE — 1200000000 HC SEMI PRIVATE

## 2019-01-01 PROCEDURE — 83735 ASSAY OF MAGNESIUM: CPT

## 2019-01-01 PROCEDURE — 97162 PT EVAL MOD COMPLEX 30 MIN: CPT

## 2019-01-01 PROCEDURE — 2709999900 HC NON-CHARGEABLE SUPPLY

## 2019-01-01 PROCEDURE — 92526 ORAL FUNCTION THERAPY: CPT | Performed by: SPEECH-LANGUAGE PATHOLOGIST

## 2019-01-01 PROCEDURE — 2500000003 HC RX 250 WO HCPCS: Performed by: SURGERY

## 2019-01-01 PROCEDURE — 87581 M.PNEUMON DNA AMP PROBE: CPT

## 2019-01-01 PROCEDURE — 82945 GLUCOSE OTHER FLUID: CPT

## 2019-01-01 PROCEDURE — P9016 RBC LEUKOCYTES REDUCED: HCPCS

## 2019-01-01 PROCEDURE — 72100 X-RAY EXAM L-S SPINE 2/3 VWS: CPT

## 2019-01-01 PROCEDURE — 85652 RBC SED RATE AUTOMATED: CPT

## 2019-01-01 PROCEDURE — 93312 ECHO TRANSESOPHAGEAL: CPT

## 2019-01-01 PROCEDURE — 99231 SBSQ HOSP IP/OBS SF/LOW 25: CPT | Performed by: INTERNAL MEDICINE

## 2019-01-01 PROCEDURE — 87798 DETECT AGENT NOS DNA AMP: CPT

## 2019-01-01 PROCEDURE — 85730 THROMBOPLASTIN TIME PARTIAL: CPT

## 2019-01-01 PROCEDURE — 94660 CPAP INITIATION&MGMT: CPT

## 2019-01-01 PROCEDURE — 6370000000 HC RX 637 (ALT 250 FOR IP): Performed by: GENERAL PRACTICE

## 2019-01-01 PROCEDURE — 73630 X-RAY EXAM OF FOOT: CPT

## 2019-01-01 PROCEDURE — 84156 ASSAY OF PROTEIN URINE: CPT

## 2019-01-01 PROCEDURE — 93005 ELECTROCARDIOGRAM TRACING: CPT | Performed by: EMERGENCY MEDICINE

## 2019-01-01 PROCEDURE — 83880 ASSAY OF NATRIURETIC PEPTIDE: CPT

## 2019-01-01 PROCEDURE — 6360000002 HC RX W HCPCS: Performed by: EMERGENCY MEDICINE

## 2019-01-01 PROCEDURE — 82140 ASSAY OF AMMONIA: CPT

## 2019-01-01 PROCEDURE — 6370000000 HC RX 637 (ALT 250 FOR IP): Performed by: PHYSICIAN ASSISTANT

## 2019-01-01 PROCEDURE — 96367 TX/PROPH/DG ADDL SEQ IV INF: CPT

## 2019-01-01 PROCEDURE — A9556 GA67 GALLIUM: HCPCS | Performed by: INTERNAL MEDICINE

## 2019-01-01 PROCEDURE — 2580000003 HC RX 258: Performed by: HOSPITALIST

## 2019-01-01 PROCEDURE — 92526 ORAL FUNCTION THERAPY: CPT

## 2019-01-01 PROCEDURE — 82805 BLOOD GASES W/O2 SATURATION: CPT

## 2019-01-01 PROCEDURE — 85018 HEMOGLOBIN: CPT

## 2019-01-01 PROCEDURE — 93010 ELECTROCARDIOGRAM REPORT: CPT | Performed by: INTERNAL MEDICINE

## 2019-01-01 PROCEDURE — 83036 HEMOGLOBIN GLYCOSYLATED A1C: CPT

## 2019-01-01 PROCEDURE — 0W9B3ZZ DRAINAGE OF LEFT PLEURAL CAVITY, PERCUTANEOUS APPROACH: ICD-10-PCS | Performed by: RADIOLOGY

## 2019-01-01 PROCEDURE — 87088 URINE BACTERIA CULTURE: CPT

## 2019-01-01 PROCEDURE — 6370000000 HC RX 637 (ALT 250 FOR IP): Performed by: EMERGENCY MEDICINE

## 2019-01-01 PROCEDURE — 36430 TRANSFUSION BLD/BLD COMPNT: CPT

## 2019-01-01 PROCEDURE — 51702 INSERT TEMP BLADDER CATH: CPT

## 2019-01-01 PROCEDURE — 78805 NM ABCESS LOCALIZATION LIMITED W GALLIUM: CPT

## 2019-01-01 PROCEDURE — 87804 INFLUENZA ASSAY W/OPTIC: CPT

## 2019-01-01 PROCEDURE — 99285 EMERGENCY DEPT VISIT HI MDM: CPT

## 2019-01-01 PROCEDURE — 88108 CYTOPATH CONCENTRATE TECH: CPT

## 2019-01-01 PROCEDURE — 71250 CT THORAX DX C-: CPT

## 2019-01-01 PROCEDURE — B246ZZ4 ULTRASONOGRAPHY OF RIGHT AND LEFT HEART, TRANSESOPHAGEAL: ICD-10-PCS | Performed by: INTERNAL MEDICINE

## 2019-01-01 PROCEDURE — 84132 ASSAY OF SERUM POTASSIUM: CPT

## 2019-01-01 PROCEDURE — 85014 HEMATOCRIT: CPT

## 2019-01-01 PROCEDURE — 87899 AGENT NOS ASSAY W/OPTIC: CPT

## 2019-01-01 PROCEDURE — 78805 NM INFLAMMATORY WBC LIMITED W INDIUM 111: CPT

## 2019-01-01 PROCEDURE — 6360000002 HC RX W HCPCS

## 2019-01-01 PROCEDURE — 6360000002 HC RX W HCPCS: Performed by: FAMILY MEDICINE

## 2019-01-01 PROCEDURE — 81050 URINALYSIS VOLUME MEASURE: CPT

## 2019-01-01 PROCEDURE — 6370000000 HC RX 637 (ALT 250 FOR IP): Performed by: NURSE PRACTITIONER

## 2019-01-01 PROCEDURE — 99223 1ST HOSP IP/OBS HIGH 75: CPT | Performed by: INTERNAL MEDICINE

## 2019-01-01 PROCEDURE — 32555 ASPIRATE PLEURA W/ IMAGING: CPT

## 2019-01-01 PROCEDURE — 97112 NEUROMUSCULAR REEDUCATION: CPT

## 2019-01-01 PROCEDURE — 1250000000 HC SEMI PRIVATE HOSPICE R&B

## 2019-01-01 PROCEDURE — 36600 WITHDRAWAL OF ARTERIAL BLOOD: CPT

## 2019-01-01 PROCEDURE — 87081 CULTURE SCREEN ONLY: CPT

## 2019-01-01 PROCEDURE — 94664 DEMO&/EVAL PT USE INHALER: CPT

## 2019-01-01 PROCEDURE — 82570 ASSAY OF URINE CREATININE: CPT

## 2019-01-01 PROCEDURE — 2000000000 HC ICU R&B

## 2019-01-01 PROCEDURE — 92610 EVALUATE SWALLOWING FUNCTION: CPT | Performed by: SPEECH-LANGUAGE PATHOLOGIST

## 2019-01-01 PROCEDURE — 86900 BLOOD TYPING SEROLOGIC ABO: CPT

## 2019-01-01 PROCEDURE — 93306 TTE W/DOPPLER COMPLETE: CPT

## 2019-01-01 PROCEDURE — 87486 CHLMYD PNEUM DNA AMP PROBE: CPT

## 2019-01-01 PROCEDURE — 87205 SMEAR GRAM STAIN: CPT

## 2019-01-01 PROCEDURE — 83615 LACTATE (LD) (LDH) ENZYME: CPT

## 2019-01-01 PROCEDURE — 70498 CT ANGIOGRAPHY NECK: CPT

## 2019-01-01 PROCEDURE — 86901 BLOOD TYPING SEROLOGIC RH(D): CPT

## 2019-01-01 PROCEDURE — 84157 ASSAY OF PROTEIN OTHER: CPT

## 2019-01-01 PROCEDURE — 86922 COMPATIBILITY TEST ANTIGLOB: CPT

## 2019-01-01 PROCEDURE — 6360000004 HC RX CONTRAST MEDICATION: Performed by: INTERNAL MEDICINE

## 2019-01-01 PROCEDURE — 88341 IMHCHEM/IMCYTCHM EA ADD ANTB: CPT

## 2019-01-01 PROCEDURE — 80061 LIPID PANEL: CPT

## 2019-01-01 PROCEDURE — 87449 NOS EACH ORGANISM AG IA: CPT

## 2019-01-01 PROCEDURE — 88342 IMHCHEM/IMCYTCHM 1ST ANTB: CPT

## 2019-01-01 PROCEDURE — 0PBV0ZZ EXCISION OF LEFT FINGER PHALANX, OPEN APPROACH: ICD-10-PCS | Performed by: SURGERY

## 2019-01-01 PROCEDURE — 97167 OT EVAL HIGH COMPLEX 60 MIN: CPT

## 2019-01-01 PROCEDURE — 89051 BODY FLUID CELL COUNT: CPT

## 2019-01-01 PROCEDURE — 96374 THER/PROPH/DIAG INJ IV PUSH: CPT

## 2019-01-01 PROCEDURE — 6360000002 HC RX W HCPCS: Performed by: NURSE ANESTHETIST, CERTIFIED REGISTERED

## 2019-01-01 PROCEDURE — 94760 N-INVAS EAR/PLS OXIMETRY 1: CPT

## 2019-01-01 RX ORDER — DEXTROSE AND SODIUM CHLORIDE 5; .45 G/100ML; G/100ML
INJECTION, SOLUTION INTRAVENOUS CONTINUOUS
Status: DISCONTINUED | OUTPATIENT
Start: 2019-01-01 | End: 2019-01-01

## 2019-01-01 RX ORDER — GLYCOPYRROLATE 0.2 MG/ML
0.1 INJECTION INTRAMUSCULAR; INTRAVENOUS EVERY 4 HOURS PRN
Status: DISCONTINUED | OUTPATIENT
Start: 2019-01-01 | End: 2019-01-01 | Stop reason: HOSPADM

## 2019-01-01 RX ORDER — INSULIN GLARGINE 100 [IU]/ML
15 INJECTION, SOLUTION SUBCUTANEOUS NIGHTLY
Status: DISCONTINUED | OUTPATIENT
Start: 2019-01-01 | End: 2019-01-01

## 2019-01-01 RX ORDER — NITROFURANTOIN 25; 75 MG/1; MG/1
100 CAPSULE ORAL EVERY 12 HOURS SCHEDULED
Qty: 20 CAPSULE | Refills: 0 | DISCHARGE
Start: 2019-01-01 | End: 2019-01-01

## 2019-01-01 RX ORDER — LORAZEPAM 2 MG/ML
0.5 INJECTION INTRAMUSCULAR
Status: CANCELLED | OUTPATIENT
Start: 2019-01-01

## 2019-01-01 RX ORDER — LIDOCAINE HYDROCHLORIDE 10 MG/ML
5 INJECTION, SOLUTION EPIDURAL; INFILTRATION; INTRACAUDAL; PERINEURAL ONCE
Status: DISCONTINUED | OUTPATIENT
Start: 2019-01-01 | End: 2019-01-01 | Stop reason: SDUPTHER

## 2019-01-01 RX ORDER — LIDOCAINE HYDROCHLORIDE 20 MG/ML
INJECTION, SOLUTION INFILTRATION; PERINEURAL PRN
Status: DISCONTINUED | OUTPATIENT
Start: 2019-01-01 | End: 2019-01-01 | Stop reason: SDUPTHER

## 2019-01-01 RX ORDER — MORPHINE SULFATE 4 MG/ML
1 INJECTION, SOLUTION INTRAMUSCULAR; INTRAVENOUS
Status: DISCONTINUED | OUTPATIENT
Start: 2019-01-01 | End: 2019-01-01 | Stop reason: HOSPADM

## 2019-01-01 RX ORDER — SODIUM BICARBONATE 650 MG/1
650 TABLET ORAL 2 TIMES DAILY
Status: DISCONTINUED | OUTPATIENT
Start: 2019-01-01 | End: 2019-01-01

## 2019-01-01 RX ORDER — IPRATROPIUM BROMIDE AND ALBUTEROL SULFATE 2.5; .5 MG/3ML; MG/3ML
1 SOLUTION RESPIRATORY (INHALATION) EVERY 4 HOURS PRN
Status: DISCONTINUED | OUTPATIENT
Start: 2019-01-01 | End: 2019-01-01 | Stop reason: HOSPADM

## 2019-01-01 RX ORDER — SODIUM CHLORIDE 0.9 % (FLUSH) 0.9 %
10 SYRINGE (ML) INJECTION PRN
Status: DISCONTINUED | OUTPATIENT
Start: 2019-01-01 | End: 2019-01-01 | Stop reason: HOSPADM

## 2019-01-01 RX ORDER — DEXTROSE MONOHYDRATE 50 MG/ML
100 INJECTION, SOLUTION INTRAVENOUS PRN
Status: DISCONTINUED | OUTPATIENT
Start: 2019-01-01 | End: 2019-01-01

## 2019-01-01 RX ORDER — CLINDAMYCIN HYDROCHLORIDE 150 MG/1
300 CAPSULE ORAL EVERY 6 HOURS SCHEDULED
Status: DISCONTINUED | OUTPATIENT
Start: 2019-01-01 | End: 2019-01-01

## 2019-01-01 RX ORDER — ONDANSETRON 2 MG/ML
4 INJECTION INTRAMUSCULAR; INTRAVENOUS EVERY 6 HOURS PRN
Status: DISCONTINUED | OUTPATIENT
Start: 2019-01-01 | End: 2019-01-01 | Stop reason: HOSPADM

## 2019-01-01 RX ORDER — SODIUM CHLORIDE 9 MG/ML
INJECTION, SOLUTION INTRAVENOUS CONTINUOUS
Status: DISCONTINUED | OUTPATIENT
Start: 2019-01-01 | End: 2019-01-01

## 2019-01-01 RX ORDER — MORPHINE SULFATE 4 MG/ML
2 INJECTION, SOLUTION INTRAMUSCULAR; INTRAVENOUS
Status: CANCELLED | OUTPATIENT
Start: 2019-01-01

## 2019-01-01 RX ORDER — DEXTROSE MONOHYDRATE 100 MG/ML
INJECTION, SOLUTION INTRAVENOUS
Status: DISCONTINUED
Start: 2019-01-01 | End: 2019-01-01

## 2019-01-01 RX ORDER — IPRATROPIUM BROMIDE AND ALBUTEROL SULFATE 2.5; .5 MG/3ML; MG/3ML
1 SOLUTION RESPIRATORY (INHALATION) ONCE
Status: COMPLETED | OUTPATIENT
Start: 2019-01-01 | End: 2019-01-01

## 2019-01-01 RX ORDER — CEFAZOLIN SODIUM 1 G/50ML
1 INJECTION, SOLUTION INTRAVENOUS EVERY 12 HOURS
Status: DISCONTINUED | OUTPATIENT
Start: 2019-01-01 | End: 2019-01-01

## 2019-01-01 RX ORDER — VANCOMYCIN HYDROCHLORIDE 1 G/200ML
1000 INJECTION, SOLUTION INTRAVENOUS ONCE
Status: DISCONTINUED | OUTPATIENT
Start: 2019-01-01 | End: 2019-01-01 | Stop reason: DRUGHIGH

## 2019-01-01 RX ORDER — RISPERIDONE 0.5 MG/1
0.25 TABLET, FILM COATED ORAL 2 TIMES DAILY
Status: DISCONTINUED | OUTPATIENT
Start: 2019-01-01 | End: 2019-01-01 | Stop reason: HOSPADM

## 2019-01-01 RX ORDER — 0.9 % SODIUM CHLORIDE 0.9 %
250 INTRAVENOUS SOLUTION INTRAVENOUS ONCE
Status: COMPLETED | OUTPATIENT
Start: 2019-01-01 | End: 2019-01-01

## 2019-01-01 RX ORDER — SODIUM POLYSTYRENE SULFONATE 4.1 MEQ/G
15 POWDER, FOR SUSPENSION ORAL; RECTAL ONCE
Status: COMPLETED | OUTPATIENT
Start: 2019-01-01 | End: 2019-01-01

## 2019-01-01 RX ORDER — SODIUM CHLORIDE 450 MG/100ML
INJECTION, SOLUTION INTRAVENOUS CONTINUOUS
Status: DISCONTINUED | OUTPATIENT
Start: 2019-01-01 | End: 2019-01-01

## 2019-01-01 RX ORDER — HYDROMORPHONE HCL 110MG/55ML
1 PATIENT CONTROLLED ANALGESIA SYRINGE INTRAVENOUS ONCE
Status: COMPLETED | OUTPATIENT
Start: 2019-01-01 | End: 2019-01-01

## 2019-01-01 RX ORDER — AMLODIPINE BESYLATE 2.5 MG/1
2.5 TABLET ORAL DAILY
Qty: 30 TABLET | Refills: 5 | Status: CANCELLED | OUTPATIENT
Start: 2019-01-01

## 2019-01-01 RX ORDER — NICOTINE POLACRILEX 4 MG
15 LOZENGE BUCCAL PRN
Status: DISCONTINUED | OUTPATIENT
Start: 2019-01-01 | End: 2019-01-01 | Stop reason: HOSPADM

## 2019-01-01 RX ORDER — CIPROFLOXACIN 2 MG/ML
400 INJECTION, SOLUTION INTRAVENOUS EVERY 24 HOURS
Status: DISCONTINUED | OUTPATIENT
Start: 2019-01-01 | End: 2019-01-01

## 2019-01-01 RX ORDER — IPRATROPIUM BROMIDE AND ALBUTEROL SULFATE 2.5; .5 MG/3ML; MG/3ML
1 SOLUTION RESPIRATORY (INHALATION)
Status: DISCONTINUED | OUTPATIENT
Start: 2019-01-01 | End: 2019-01-01

## 2019-01-01 RX ORDER — FAMOTIDINE 20 MG/1
20 TABLET, FILM COATED ORAL DAILY
Status: DISCONTINUED | OUTPATIENT
Start: 2019-01-01 | End: 2019-01-01 | Stop reason: HOSPADM

## 2019-01-01 RX ORDER — ACETAMINOPHEN 160 MG/5ML
650 SUSPENSION, ORAL (FINAL DOSE FORM) ORAL ONCE
Status: COMPLETED | OUTPATIENT
Start: 2019-01-01 | End: 2019-01-01

## 2019-01-01 RX ORDER — DOCUSATE SODIUM 100 MG/1
100 CAPSULE, LIQUID FILLED ORAL EVERY OTHER DAY
Status: DISCONTINUED | OUTPATIENT
Start: 2019-01-01 | End: 2019-01-01 | Stop reason: HOSPADM

## 2019-01-01 RX ORDER — DEXTROSE MONOHYDRATE 50 MG/ML
100 INJECTION, SOLUTION INTRAVENOUS PRN
Status: DISCONTINUED | OUTPATIENT
Start: 2019-01-01 | End: 2019-01-01 | Stop reason: HOSPADM

## 2019-01-01 RX ORDER — CIPROFLOXACIN 250 MG/1
250 TABLET, FILM COATED ORAL 2 TIMES DAILY
Qty: 14 TABLET | Refills: 0 | Status: CANCELLED | OUTPATIENT
Start: 2019-01-01 | End: 2019-01-01

## 2019-01-01 RX ORDER — DEXTROSE MONOHYDRATE 25 G/50ML
12.5 INJECTION, SOLUTION INTRAVENOUS PRN
Status: DISCONTINUED | OUTPATIENT
Start: 2019-01-01 | End: 2019-01-01 | Stop reason: HOSPADM

## 2019-01-01 RX ORDER — AMLODIPINE BESYLATE 10 MG/1
10 TABLET ORAL DAILY
Qty: 30 TABLET | Refills: 3 | DISCHARGE
Start: 2019-01-01

## 2019-01-01 RX ORDER — VANCOMYCIN HYDROCHLORIDE 1 G/200ML
1000 INJECTION, SOLUTION INTRAVENOUS ONCE
Status: COMPLETED | OUTPATIENT
Start: 2019-01-01 | End: 2019-01-01

## 2019-01-01 RX ORDER — SODIUM CHLORIDE 0.9 % (FLUSH) 0.9 %
10 SYRINGE (ML) INJECTION EVERY 12 HOURS SCHEDULED
Status: DISCONTINUED | OUTPATIENT
Start: 2019-01-01 | End: 2019-01-01 | Stop reason: HOSPADM

## 2019-01-01 RX ORDER — 0.9 % SODIUM CHLORIDE 0.9 %
1000 INTRAVENOUS SOLUTION INTRAVENOUS ONCE
Status: COMPLETED | OUTPATIENT
Start: 2019-01-01 | End: 2019-01-01

## 2019-01-01 RX ORDER — LORAZEPAM 2 MG/ML
0.5 INJECTION INTRAMUSCULAR EVERY 6 HOURS PRN
Status: DISCONTINUED | OUTPATIENT
Start: 2019-01-01 | End: 2019-01-01 | Stop reason: HOSPADM

## 2019-01-01 RX ORDER — POLYETHYLENE GLYCOL 3350 17 G/17G
17 POWDER, FOR SOLUTION ORAL DAILY
Qty: 527 G | Refills: 1 | DISCHARGE
Start: 2019-01-01 | End: 2019-04-27

## 2019-01-01 RX ORDER — FUROSEMIDE 10 MG/ML
40 INJECTION INTRAMUSCULAR; INTRAVENOUS ONCE
Status: COMPLETED | OUTPATIENT
Start: 2019-01-01 | End: 2019-01-01

## 2019-01-01 RX ORDER — ASPIRIN 81 MG/1
81 TABLET, CHEWABLE ORAL DAILY
Status: DISCONTINUED | OUTPATIENT
Start: 2019-01-01 | End: 2019-01-01 | Stop reason: HOSPADM

## 2019-01-01 RX ORDER — FAMOTIDINE 20 MG/1
20 TABLET, FILM COATED ORAL 2 TIMES DAILY
Status: DISCONTINUED | OUTPATIENT
Start: 2019-01-01 | End: 2019-01-01 | Stop reason: DRUGHIGH

## 2019-01-01 RX ORDER — LOSARTAN POTASSIUM 50 MG/1
50 TABLET ORAL DAILY
Status: ON HOLD | COMMUNITY
End: 2019-01-01 | Stop reason: HOSPADM

## 2019-01-01 RX ORDER — FUROSEMIDE 10 MG/ML
20 INJECTION INTRAMUSCULAR; INTRAVENOUS 2 TIMES DAILY
Status: COMPLETED | OUTPATIENT
Start: 2019-01-01 | End: 2019-01-01

## 2019-01-01 RX ORDER — AMLODIPINE BESYLATE 10 MG/1
10 TABLET ORAL DAILY
Status: DISCONTINUED | OUTPATIENT
Start: 2019-01-01 | End: 2019-01-01 | Stop reason: HOSPADM

## 2019-01-01 RX ORDER — HALOPERIDOL 5 MG/ML
0.5 INJECTION INTRAMUSCULAR EVERY 6 HOURS PRN
Status: DISCONTINUED | OUTPATIENT
Start: 2019-01-01 | End: 2019-01-01 | Stop reason: HOSPADM

## 2019-01-01 RX ORDER — ACETAMINOPHEN 650 MG/1
650 SUPPOSITORY RECTAL EVERY 4 HOURS PRN
Status: DISCONTINUED | OUTPATIENT
Start: 2019-01-01 | End: 2019-01-01 | Stop reason: HOSPADM

## 2019-01-01 RX ORDER — GLYCOPYRROLATE 0.2 MG/ML
0.1 INJECTION INTRAMUSCULAR; INTRAVENOUS EVERY 4 HOURS PRN
Status: CANCELLED | OUTPATIENT
Start: 2019-01-01

## 2019-01-01 RX ORDER — ACETAMINOPHEN 325 MG/1
650 TABLET ORAL EVERY 4 HOURS PRN
Status: DISCONTINUED | OUTPATIENT
Start: 2019-01-01 | End: 2019-01-01 | Stop reason: HOSPADM

## 2019-01-01 RX ORDER — FAMOTIDINE 20 MG/1
20 TABLET, FILM COATED ORAL 2 TIMES DAILY
COMMUNITY

## 2019-01-01 RX ORDER — CLOPIDOGREL BISULFATE 75 MG/1
75 TABLET ORAL DAILY
Status: DISCONTINUED | OUTPATIENT
Start: 2019-01-01 | End: 2019-01-01 | Stop reason: HOSPADM

## 2019-01-01 RX ORDER — ATROPINE SULFATE 0.1 MG/ML
0.5 INJECTION INTRAVENOUS ONCE
Status: COMPLETED | OUTPATIENT
Start: 2019-01-01 | End: 2019-01-01

## 2019-01-01 RX ORDER — SODIUM BICARBONATE 650 MG/1
650 TABLET ORAL 2 TIMES DAILY
Status: DISCONTINUED | OUTPATIENT
Start: 2019-01-01 | End: 2019-01-01 | Stop reason: HOSPADM

## 2019-01-01 RX ORDER — PROPOFOL 10 MG/ML
INJECTION, EMULSION INTRAVENOUS PRN
Status: DISCONTINUED | OUTPATIENT
Start: 2019-01-01 | End: 2019-01-01 | Stop reason: SDUPTHER

## 2019-01-01 RX ORDER — DEXTROSE MONOHYDRATE 25 G/50ML
25 INJECTION, SOLUTION INTRAVENOUS ONCE
Status: COMPLETED | OUTPATIENT
Start: 2019-01-01 | End: 2019-01-01

## 2019-01-01 RX ORDER — SULFAMETHOXAZOLE AND TRIMETHOPRIM 800; 160 MG/1; MG/1
1 TABLET ORAL 2 TIMES DAILY
Status: ON HOLD | COMMUNITY
Start: 2019-01-01 | End: 2019-01-01 | Stop reason: HOSPADM

## 2019-01-01 RX ORDER — SULFAMETHOXAZOLE AND TRIMETHOPRIM 800; 160 MG/1; MG/1
1 TABLET ORAL ONCE
Status: COMPLETED | OUTPATIENT
Start: 2019-01-01 | End: 2019-01-01

## 2019-01-01 RX ORDER — SULFAMETHOXAZOLE AND TRIMETHOPRIM 400; 80 MG/1; MG/1
1 TABLET ORAL EVERY 12 HOURS SCHEDULED
Status: DISCONTINUED | OUTPATIENT
Start: 2019-01-01 | End: 2019-01-01 | Stop reason: HOSPADM

## 2019-01-01 RX ORDER — 0.9 % SODIUM CHLORIDE 0.9 %
250 INTRAVENOUS SOLUTION INTRAVENOUS ONCE
Status: DISCONTINUED | OUTPATIENT
Start: 2019-01-01 | End: 2019-01-01 | Stop reason: HOSPADM

## 2019-01-01 RX ORDER — LOSARTAN POTASSIUM 50 MG/1
50 TABLET ORAL DAILY
Status: DISCONTINUED | OUTPATIENT
Start: 2019-01-01 | End: 2019-01-01

## 2019-01-01 RX ORDER — CEFAZOLIN SODIUM 1 G/50ML
1 INJECTION, SOLUTION INTRAVENOUS EVERY 8 HOURS
Status: DISCONTINUED | OUTPATIENT
Start: 2019-01-01 | End: 2019-01-01

## 2019-01-01 RX ORDER — INSULIN GLARGINE 100 [IU]/ML
10 INJECTION, SOLUTION SUBCUTANEOUS NIGHTLY
Status: DISCONTINUED | OUTPATIENT
Start: 2019-01-01 | End: 2019-01-01

## 2019-01-01 RX ORDER — MORPHINE SULFATE 4 MG/ML
2 INJECTION, SOLUTION INTRAMUSCULAR; INTRAVENOUS
Status: DISCONTINUED | OUTPATIENT
Start: 2019-01-01 | End: 2019-01-01 | Stop reason: HOSPADM

## 2019-01-01 RX ORDER — BACITRACIN, NEOMYCIN, POLYMYXIN B 400; 3.5; 5 [USP'U]/G; MG/G; [USP'U]/G
OINTMENT TOPICAL
DISCHARGE
Start: 2019-01-01 | End: 2019-04-06

## 2019-01-01 RX ORDER — GALLIUM CITRATE GA-67 2 MCI/ML
5 INJECTION INTRAVENOUS
Status: COMPLETED | OUTPATIENT
Start: 2019-01-01 | End: 2019-01-01

## 2019-01-01 RX ORDER — NITROFURANTOIN 25; 75 MG/1; MG/1
100 CAPSULE ORAL EVERY 12 HOURS SCHEDULED
Status: DISCONTINUED | OUTPATIENT
Start: 2019-01-01 | End: 2019-01-01 | Stop reason: HOSPADM

## 2019-01-01 RX ORDER — LOSARTAN POTASSIUM 50 MG/1
50 TABLET ORAL DAILY
Refills: 6 | Status: ON HOLD | COMMUNITY
Start: 2019-01-01 | End: 2019-01-01 | Stop reason: HOSPADM

## 2019-01-01 RX ORDER — LIDOCAINE HYDROCHLORIDE 10 MG/ML
5 INJECTION, SOLUTION EPIDURAL; INFILTRATION; INTRACAUDAL; PERINEURAL ONCE
Status: COMPLETED | OUTPATIENT
Start: 2019-01-01 | End: 2019-01-01

## 2019-01-01 RX ORDER — AMLODIPINE BESYLATE 5 MG/1
5 TABLET ORAL DAILY
Status: DISCONTINUED | OUTPATIENT
Start: 2019-01-01 | End: 2019-01-01

## 2019-01-01 RX ORDER — ONDANSETRON 2 MG/ML
4 INJECTION INTRAMUSCULAR; INTRAVENOUS EVERY 6 HOURS PRN
Status: CANCELLED | OUTPATIENT
Start: 2019-01-01

## 2019-01-01 RX ORDER — AMLODIPINE BESYLATE 5 MG/1
5 TABLET ORAL DAILY
Qty: 30 TABLET | Refills: 3 | Status: ON HOLD | DISCHARGE
Start: 2019-01-01 | End: 2019-01-01 | Stop reason: HOSPADM

## 2019-01-01 RX ORDER — SODIUM CHLORIDE 0.9 % (FLUSH) 0.9 %
10 SYRINGE (ML) INJECTION EVERY 12 HOURS SCHEDULED
Status: CANCELLED | OUTPATIENT
Start: 2019-01-01

## 2019-01-01 RX ORDER — LORAZEPAM 2 MG/ML
0.5 INJECTION INTRAMUSCULAR
Status: DISCONTINUED | OUTPATIENT
Start: 2019-01-01 | End: 2019-01-01 | Stop reason: HOSPADM

## 2019-01-01 RX ORDER — POLYETHYLENE GLYCOL 3350 17 G/17G
17 POWDER, FOR SOLUTION ORAL DAILY
Status: DISCONTINUED | OUTPATIENT
Start: 2019-01-01 | End: 2019-01-01 | Stop reason: HOSPADM

## 2019-01-01 RX ORDER — BACITRACIN, NEOMYCIN, POLYMYXIN B 400; 3.5; 5 [USP'U]/G; MG/G; [USP'U]/G
OINTMENT TOPICAL 2 TIMES DAILY
Status: DISCONTINUED | OUTPATIENT
Start: 2019-01-01 | End: 2019-01-01 | Stop reason: HOSPADM

## 2019-01-01 RX ORDER — NICOTINE POLACRILEX 4 MG
15 LOZENGE BUCCAL PRN
Status: DISCONTINUED | OUTPATIENT
Start: 2019-01-01 | End: 2019-01-01

## 2019-01-01 RX ORDER — CARVEDILOL 6.25 MG/1
6.25 TABLET ORAL 2 TIMES DAILY WITH MEALS
Status: DISCONTINUED | OUTPATIENT
Start: 2019-01-01 | End: 2019-01-01 | Stop reason: HOSPADM

## 2019-01-01 RX ORDER — MORPHINE SULFATE 4 MG/ML
1 INJECTION, SOLUTION INTRAMUSCULAR; INTRAVENOUS
Status: CANCELLED | OUTPATIENT
Start: 2019-01-01

## 2019-01-01 RX ORDER — CARVEDILOL 6.25 MG/1
6.25 TABLET ORAL 2 TIMES DAILY WITH MEALS
Qty: 60 TABLET | Refills: 3 | DISCHARGE
Start: 2019-01-01

## 2019-01-01 RX ORDER — 0.9 % SODIUM CHLORIDE 0.9 %
500 INTRAVENOUS SOLUTION INTRAVENOUS ONCE
Status: COMPLETED | OUTPATIENT
Start: 2019-01-01 | End: 2019-01-01

## 2019-01-01 RX ORDER — CIPROFLOXACIN 2 MG/ML
400 INJECTION, SOLUTION INTRAVENOUS ONCE
Status: COMPLETED | OUTPATIENT
Start: 2019-01-01 | End: 2019-01-01

## 2019-01-01 RX ORDER — SODIUM CHLORIDE 0.9 % (FLUSH) 0.9 %
10 SYRINGE (ML) INJECTION PRN
Status: CANCELLED | OUTPATIENT
Start: 2019-01-01

## 2019-01-01 RX ORDER — CLONIDINE HYDROCHLORIDE 0.1 MG/1
0.1 TABLET ORAL
Status: DISCONTINUED | OUTPATIENT
Start: 2019-01-01 | End: 2019-01-01 | Stop reason: HOSPADM

## 2019-01-01 RX ORDER — OXYBUTYNIN CHLORIDE 5 MG/1
5 TABLET, EXTENDED RELEASE ORAL DAILY
Status: DISCONTINUED | OUTPATIENT
Start: 2019-01-01 | End: 2019-01-01 | Stop reason: HOSPADM

## 2019-01-01 RX ORDER — DEXTROSE MONOHYDRATE 25 G/50ML
12.5 INJECTION, SOLUTION INTRAVENOUS PRN
Status: DISCONTINUED | OUTPATIENT
Start: 2019-01-01 | End: 2019-01-01

## 2019-01-01 RX ORDER — RISPERIDONE 0.25 MG/1
0.25 TABLET, FILM COATED ORAL 2 TIMES DAILY
Qty: 60 TABLET | Refills: 3 | DISCHARGE
Start: 2019-01-01 | End: 2019-01-01

## 2019-01-01 RX ORDER — AMLODIPINE BESYLATE 5 MG/1
5 TABLET ORAL DAILY
Status: DISCONTINUED | OUTPATIENT
Start: 2019-01-01 | End: 2019-01-01 | Stop reason: HOSPADM

## 2019-01-01 RX ORDER — ZIPRASIDONE MESYLATE 20 MG/ML
10 INJECTION, POWDER, LYOPHILIZED, FOR SOLUTION INTRAMUSCULAR EVERY 6 HOURS PRN
Status: DISCONTINUED | OUTPATIENT
Start: 2019-01-01 | End: 2019-01-01

## 2019-01-01 RX ORDER — ACETAMINOPHEN 650 MG/1
650 SUPPOSITORY RECTAL EVERY 4 HOURS PRN
Status: CANCELLED | OUTPATIENT
Start: 2019-01-01

## 2019-01-01 RX ORDER — CEFAZOLIN SODIUM 1 G/50ML
1 INJECTION, SOLUTION INTRAVENOUS EVERY 24 HOURS
Status: DISCONTINUED | OUTPATIENT
Start: 2019-01-01 | End: 2019-01-01

## 2019-01-01 RX ADMIN — INSULIN LISPRO 2 UNITS: 100 INJECTION, SOLUTION INTRAVENOUS; SUBCUTANEOUS at 15:31

## 2019-01-01 RX ADMIN — RISPERIDONE 0.25 MG: 0.5 TABLET, FILM COATED ORAL at 11:06

## 2019-01-01 RX ADMIN — INSULIN LISPRO 2 UNITS: 100 INJECTION, SOLUTION INTRAVENOUS; SUBCUTANEOUS at 10:09

## 2019-01-01 RX ADMIN — INSULIN LISPRO 2 UNITS: 100 INJECTION, SOLUTION INTRAVENOUS; SUBCUTANEOUS at 10:29

## 2019-01-01 RX ADMIN — BACITRACIN, NEOMYCIN, POLYMYXIN B: 400; 3.5; 5 OINTMENT TOPICAL at 20:08

## 2019-01-01 RX ADMIN — INSULIN LISPRO 2 UNITS: 100 INJECTION, SOLUTION INTRAVENOUS; SUBCUTANEOUS at 08:27

## 2019-01-01 RX ADMIN — METOPROLOL TARTRATE 25 MG: 25 TABLET ORAL at 20:35

## 2019-01-01 RX ADMIN — INSULIN LISPRO 1 UNITS: 100 INJECTION, SOLUTION INTRAVENOUS; SUBCUTANEOUS at 20:44

## 2019-01-01 RX ADMIN — SODIUM CHLORIDE, PRESERVATIVE FREE 10 ML: 5 INJECTION INTRAVENOUS at 22:03

## 2019-01-01 RX ADMIN — RISPERIDONE 0.25 MG: 0.5 TABLET, FILM COATED ORAL at 09:44

## 2019-01-01 RX ADMIN — MORPHINE SULFATE 1 MG: 4 INJECTION INTRAVENOUS at 21:50

## 2019-01-01 RX ADMIN — IPRATROPIUM BROMIDE AND ALBUTEROL SULFATE 1 AMPULE: .5; 3 SOLUTION RESPIRATORY (INHALATION) at 08:36

## 2019-01-01 RX ADMIN — CLOPIDOGREL BISULFATE 75 MG: 75 TABLET ORAL at 10:20

## 2019-01-01 RX ADMIN — IPRATROPIUM BROMIDE AND ALBUTEROL SULFATE 1 AMPULE: .5; 3 SOLUTION RESPIRATORY (INHALATION) at 08:30

## 2019-01-01 RX ADMIN — AMLODIPINE BESYLATE 5 MG: 5 TABLET ORAL at 11:06

## 2019-01-01 RX ADMIN — INSULIN LISPRO 1 UNITS: 100 INJECTION, SOLUTION INTRAVENOUS; SUBCUTANEOUS at 20:30

## 2019-01-01 RX ADMIN — INSULIN GLARGINE 10 UNITS: 100 INJECTION, SOLUTION SUBCUTANEOUS at 20:50

## 2019-01-01 RX ADMIN — ASPIRIN 81 MG 81 MG: 81 TABLET ORAL at 10:21

## 2019-01-01 RX ADMIN — INSULIN LISPRO 3 UNITS: 100 INJECTION, SOLUTION INTRAVENOUS; SUBCUTANEOUS at 08:18

## 2019-01-01 RX ADMIN — IPRATROPIUM BROMIDE AND ALBUTEROL SULFATE 1 AMPULE: .5; 3 SOLUTION RESPIRATORY (INHALATION) at 12:10

## 2019-01-01 RX ADMIN — SODIUM CHLORIDE, PRESERVATIVE FREE 10 ML: 5 INJECTION INTRAVENOUS at 20:07

## 2019-01-01 RX ADMIN — INSULIN LISPRO 2 UNITS: 100 INJECTION, SOLUTION INTRAVENOUS; SUBCUTANEOUS at 08:24

## 2019-01-01 RX ADMIN — INSULIN LISPRO 4 UNITS: 100 INJECTION, SOLUTION INTRAVENOUS; SUBCUTANEOUS at 12:28

## 2019-01-01 RX ADMIN — DEXTROSE MONOHYDRATE 12.5 G: 500 INJECTION PARENTERAL at 18:04

## 2019-01-01 RX ADMIN — IPRATROPIUM BROMIDE AND ALBUTEROL SULFATE 1 AMPULE: .5; 3 SOLUTION RESPIRATORY (INHALATION) at 08:43

## 2019-01-01 RX ADMIN — POLYETHYLENE GLYCOL 3350 17 G: 17 POWDER, FOR SOLUTION ORAL at 09:32

## 2019-01-01 RX ADMIN — ASPIRIN 81 MG 81 MG: 81 TABLET ORAL at 08:21

## 2019-01-01 RX ADMIN — SODIUM CHLORIDE, PRESERVATIVE FREE 10 ML: 5 INJECTION INTRAVENOUS at 21:03

## 2019-01-01 RX ADMIN — INSULIN LISPRO 5 UNITS: 100 INJECTION, SOLUTION INTRAVENOUS; SUBCUTANEOUS at 16:35

## 2019-01-01 RX ADMIN — BACITRACIN, NEOMYCIN, POLYMYXIN B: 400; 3.5; 5 OINTMENT TOPICAL at 15:18

## 2019-01-01 RX ADMIN — MORPHINE SULFATE 1 MG: 4 INJECTION INTRAVENOUS at 00:30

## 2019-01-01 RX ADMIN — CIPROFLOXACIN 400 MG: 2 INJECTION, SOLUTION INTRAVENOUS at 19:38

## 2019-01-01 RX ADMIN — INSULIN LISPRO 3 UNITS: 100 INJECTION, SOLUTION INTRAVENOUS; SUBCUTANEOUS at 13:57

## 2019-01-01 RX ADMIN — INSULIN LISPRO 6 UNITS: 100 INJECTION, SOLUTION INTRAVENOUS; SUBCUTANEOUS at 23:58

## 2019-01-01 RX ADMIN — METOPROLOL TARTRATE 25 MG: 25 TABLET ORAL at 20:37

## 2019-01-01 RX ADMIN — ENOXAPARIN SODIUM 30 MG: 30 INJECTION SUBCUTANEOUS at 09:38

## 2019-01-01 RX ADMIN — SODIUM CHLORIDE, PRESERVATIVE FREE 10 ML: 5 INJECTION INTRAVENOUS at 20:18

## 2019-01-01 RX ADMIN — CARVEDILOL 6.25 MG: 6.25 TABLET, FILM COATED ORAL at 17:14

## 2019-01-01 RX ADMIN — CEFEPIME HYDROCHLORIDE 1 G: 1 INJECTION, POWDER, FOR SOLUTION INTRAMUSCULAR; INTRAVENOUS at 11:59

## 2019-01-01 RX ADMIN — SODIUM BICARBONATE 650 MG TABLET 650 MG: at 21:02

## 2019-01-01 RX ADMIN — INSULIN LISPRO 3 UNITS: 100 INJECTION, SOLUTION INTRAVENOUS; SUBCUTANEOUS at 08:55

## 2019-01-01 RX ADMIN — CEFAZOLIN SODIUM 1 G: 1 INJECTION, SOLUTION INTRAVENOUS at 00:55

## 2019-01-01 RX ADMIN — IPRATROPIUM BROMIDE AND ALBUTEROL SULFATE 1 AMPULE: .5; 3 SOLUTION RESPIRATORY (INHALATION) at 08:33

## 2019-01-01 RX ADMIN — LORAZEPAM 0.5 MG: 2 INJECTION INTRAMUSCULAR; INTRAVENOUS at 08:14

## 2019-01-01 RX ADMIN — METOPROLOL TARTRATE 25 MG: 25 TABLET ORAL at 09:24

## 2019-01-01 RX ADMIN — VANCOMYCIN HYDROCHLORIDE 1000 MG: 1 INJECTION, SOLUTION INTRAVENOUS at 14:29

## 2019-01-01 RX ADMIN — SODIUM CHLORIDE, PRESERVATIVE FREE 10 ML: 5 INJECTION INTRAVENOUS at 23:25

## 2019-01-01 RX ADMIN — ASPIRIN 81 MG 81 MG: 81 TABLET ORAL at 08:58

## 2019-01-01 RX ADMIN — SODIUM CHLORIDE, PRESERVATIVE FREE 10 ML: 5 INJECTION INTRAVENOUS at 09:31

## 2019-01-01 RX ADMIN — SODIUM CHLORIDE, PRESERVATIVE FREE 10 ML: 5 INJECTION INTRAVENOUS at 20:29

## 2019-01-01 RX ADMIN — ASPIRIN 81 MG 81 MG: 81 TABLET ORAL at 09:05

## 2019-01-01 RX ADMIN — SODIUM CHLORIDE, PRESERVATIVE FREE 10 ML: 5 INJECTION INTRAVENOUS at 08:22

## 2019-01-01 RX ADMIN — ENOXAPARIN SODIUM 30 MG: 30 INJECTION SUBCUTANEOUS at 08:14

## 2019-01-01 RX ADMIN — SODIUM CHLORIDE: 4.5 INJECTION, SOLUTION INTRAVENOUS at 04:27

## 2019-01-01 RX ADMIN — ENOXAPARIN SODIUM 30 MG: 30 INJECTION SUBCUTANEOUS at 09:05

## 2019-01-01 RX ADMIN — DEXTROSE MONOHYDRATE 25 G: 500 INJECTION PARENTERAL at 12:42

## 2019-01-01 RX ADMIN — LORAZEPAM 0.5 MG: 2 INJECTION INTRAMUSCULAR; INTRAVENOUS at 01:41

## 2019-01-01 RX ADMIN — POTASSIUM PHOSPHATE, MONOBASIC AND POTASSIUM PHOSPHATE, DIBASIC 15 MMOL: 224; 236 INJECTION, SOLUTION, CONCENTRATE INTRAVENOUS at 14:49

## 2019-01-01 RX ADMIN — Medication 500 MICRO CURIE: at 11:52

## 2019-01-01 RX ADMIN — SODIUM CHLORIDE: 9 INJECTION, SOLUTION INTRAVENOUS at 23:23

## 2019-01-01 RX ADMIN — INSULIN LISPRO 2 UNITS: 100 INJECTION, SOLUTION INTRAVENOUS; SUBCUTANEOUS at 15:39

## 2019-01-01 RX ADMIN — INSULIN LISPRO 3 UNITS: 100 INJECTION, SOLUTION INTRAVENOUS; SUBCUTANEOUS at 01:34

## 2019-01-01 RX ADMIN — DOCUSATE SODIUM 100 MG: 100 CAPSULE, LIQUID FILLED ORAL at 09:43

## 2019-01-01 RX ADMIN — CLOPIDOGREL BISULFATE 75 MG: 75 TABLET ORAL at 10:12

## 2019-01-01 RX ADMIN — INSULIN LISPRO 2 UNITS: 100 INJECTION, SOLUTION INTRAVENOUS; SUBCUTANEOUS at 12:41

## 2019-01-01 RX ADMIN — BACITRACIN, NEOMYCIN, POLYMYXIN B: 400; 3.5; 5 OINTMENT TOPICAL at 15:02

## 2019-01-01 RX ADMIN — DEXTROSE MONOHYDRATE 12.5 G: 500 INJECTION PARENTERAL at 16:55

## 2019-01-01 RX ADMIN — INSULIN HUMAN 7 UNITS: 100 INJECTION, SUSPENSION SUBCUTANEOUS at 17:57

## 2019-01-01 RX ADMIN — ENOXAPARIN SODIUM 30 MG: 30 INJECTION SUBCUTANEOUS at 10:14

## 2019-01-01 RX ADMIN — INSULIN LISPRO 3 UNITS: 100 INJECTION, SOLUTION INTRAVENOUS; SUBCUTANEOUS at 12:02

## 2019-01-01 RX ADMIN — ENOXAPARIN SODIUM 30 MG: 30 INJECTION SUBCUTANEOUS at 09:43

## 2019-01-01 RX ADMIN — IPRATROPIUM BROMIDE AND ALBUTEROL SULFATE 1 AMPULE: .5; 3 SOLUTION RESPIRATORY (INHALATION) at 15:51

## 2019-01-01 RX ADMIN — IPRATROPIUM BROMIDE AND ALBUTEROL SULFATE 1 AMPULE: .5; 3 SOLUTION RESPIRATORY (INHALATION) at 08:51

## 2019-01-01 RX ADMIN — DEXTROSE MONOHYDRATE 12.5 G: 25 INJECTION, SOLUTION INTRAVENOUS at 07:17

## 2019-01-01 RX ADMIN — FAMOTIDINE 20 MG: 20 TABLET ORAL at 11:07

## 2019-01-01 RX ADMIN — INSULIN LISPRO 1 UNITS: 100 INJECTION, SOLUTION INTRAVENOUS; SUBCUTANEOUS at 12:36

## 2019-01-01 RX ADMIN — AMLODIPINE BESYLATE 10 MG: 10 TABLET ORAL at 09:32

## 2019-01-01 RX ADMIN — VANCOMYCIN HYDROCHLORIDE 1000 MG: 1 INJECTION, SOLUTION INTRAVENOUS at 15:18

## 2019-01-01 RX ADMIN — IPRATROPIUM BROMIDE AND ALBUTEROL SULFATE 1 AMPULE: .5; 3 SOLUTION RESPIRATORY (INHALATION) at 11:44

## 2019-01-01 RX ADMIN — SODIUM BICARBONATE 650 MG TABLET 650 MG: at 10:20

## 2019-01-01 RX ADMIN — INSULIN LISPRO 1 UNITS: 100 INJECTION, SOLUTION INTRAVENOUS; SUBCUTANEOUS at 12:52

## 2019-01-01 RX ADMIN — METOPROLOL TARTRATE 25 MG: 25 TABLET ORAL at 09:32

## 2019-01-01 RX ADMIN — ACETAMINOPHEN 650 MG: 325 TABLET ORAL at 10:17

## 2019-01-01 RX ADMIN — ASPIRIN 81 MG 81 MG: 81 TABLET ORAL at 11:06

## 2019-01-01 RX ADMIN — IPRATROPIUM BROMIDE AND ALBUTEROL SULFATE 1 AMPULE: .5; 3 SOLUTION RESPIRATORY (INHALATION) at 19:54

## 2019-01-01 RX ADMIN — INSULIN GLARGINE 10 UNITS: 100 INJECTION, SOLUTION SUBCUTANEOUS at 21:33

## 2019-01-01 RX ADMIN — ACETAMINOPHEN 650 MG: 325 TABLET ORAL at 10:00

## 2019-01-01 RX ADMIN — IPRATROPIUM BROMIDE AND ALBUTEROL SULFATE 1 AMPULE: .5; 3 SOLUTION RESPIRATORY (INHALATION) at 16:19

## 2019-01-01 RX ADMIN — ACETAMINOPHEN 650 MG: 325 TABLET ORAL at 20:05

## 2019-01-01 RX ADMIN — ASPIRIN 81 MG 81 MG: 81 TABLET ORAL at 09:44

## 2019-01-01 RX ADMIN — RISPERIDONE 0.25 MG: 0.5 TABLET, FILM COATED ORAL at 21:02

## 2019-01-01 RX ADMIN — FAMOTIDINE 20 MG: 20 TABLET ORAL at 09:51

## 2019-01-01 RX ADMIN — CIPROFLOXACIN 400 MG: 2 INJECTION, SOLUTION INTRAVENOUS at 18:41

## 2019-01-01 RX ADMIN — POTASSIUM CHLORIDE: 2 INJECTION, SOLUTION, CONCENTRATE INTRAVENOUS at 17:33

## 2019-01-01 RX ADMIN — HALOPERIDOL LACTATE 0.5 MG: 5 INJECTION, SOLUTION INTRAMUSCULAR at 03:34

## 2019-01-01 RX ADMIN — SODIUM CHLORIDE, PRESERVATIVE FREE 10 ML: 5 INJECTION INTRAVENOUS at 00:13

## 2019-01-01 RX ADMIN — DAPTOMYCIN 400 MG: 500 INJECTION, POWDER, LYOPHILIZED, FOR SOLUTION INTRAVENOUS at 12:47

## 2019-01-01 RX ADMIN — INSULIN LISPRO 1 UNITS: 100 INJECTION, SOLUTION INTRAVENOUS; SUBCUTANEOUS at 00:09

## 2019-01-01 RX ADMIN — IPRATROPIUM BROMIDE AND ALBUTEROL SULFATE 1 AMPULE: .5; 3 SOLUTION RESPIRATORY (INHALATION) at 19:50

## 2019-01-01 RX ADMIN — ATROPINE SULFATE 0.5 MG: 0.1 INJECTION, SOLUTION INTRAVENOUS at 14:53

## 2019-01-01 RX ADMIN — SODIUM CHLORIDE, PRESERVATIVE FREE 10 ML: 5 INJECTION INTRAVENOUS at 09:38

## 2019-01-01 RX ADMIN — IPRATROPIUM BROMIDE AND ALBUTEROL SULFATE 1 AMPULE: .5; 3 SOLUTION RESPIRATORY (INHALATION) at 07:42

## 2019-01-01 RX ADMIN — CEFAZOLIN SODIUM 1 G: 1 INJECTION, SOLUTION INTRAVENOUS at 08:22

## 2019-01-01 RX ADMIN — INSULIN HUMAN 8 UNITS: 100 INJECTION, SUSPENSION SUBCUTANEOUS at 17:07

## 2019-01-01 RX ADMIN — INSULIN LISPRO 2 UNITS: 100 INJECTION, SOLUTION INTRAVENOUS; SUBCUTANEOUS at 04:07

## 2019-01-01 RX ADMIN — LIDOCAINE HYDROCHLORIDE 5 ML: 10 INJECTION, SOLUTION EPIDURAL; INFILTRATION; INTRACAUDAL; PERINEURAL at 14:28

## 2019-01-01 RX ADMIN — CALCIUM GLUCONATE 1 G: 94 INJECTION, SOLUTION INTRAVENOUS at 03:52

## 2019-01-01 RX ADMIN — IPRATROPIUM BROMIDE AND ALBUTEROL SULFATE 1 AMPULE: .5; 3 SOLUTION RESPIRATORY (INHALATION) at 21:25

## 2019-01-01 RX ADMIN — SODIUM CHLORIDE, PRESERVATIVE FREE 10 ML: 5 INJECTION INTRAVENOUS at 20:36

## 2019-01-01 RX ADMIN — ENOXAPARIN SODIUM 30 MG: 30 INJECTION SUBCUTANEOUS at 09:24

## 2019-01-01 RX ADMIN — ASPIRIN 81 MG 81 MG: 81 TABLET ORAL at 10:12

## 2019-01-01 RX ADMIN — INSULIN LISPRO 2 UNITS: 100 INJECTION, SOLUTION INTRAVENOUS; SUBCUTANEOUS at 00:28

## 2019-01-01 RX ADMIN — IPRATROPIUM BROMIDE AND ALBUTEROL SULFATE 1 AMPULE: .5; 3 SOLUTION RESPIRATORY (INHALATION) at 21:50

## 2019-01-01 RX ADMIN — INSULIN LISPRO 1 UNITS: 100 INJECTION, SOLUTION INTRAVENOUS; SUBCUTANEOUS at 08:38

## 2019-01-01 RX ADMIN — POTASSIUM CHLORIDE: 2 INJECTION, SOLUTION, CONCENTRATE INTRAVENOUS at 10:50

## 2019-01-01 RX ADMIN — AMLODIPINE BESYLATE 5 MG: 5 TABLET ORAL at 09:43

## 2019-01-01 RX ADMIN — CEFAZOLIN SODIUM 1 G: 1 INJECTION, SOLUTION INTRAVENOUS at 10:38

## 2019-01-01 RX ADMIN — SODIUM CHLORIDE: 9 INJECTION, SOLUTION INTRAVENOUS at 18:20

## 2019-01-01 RX ADMIN — SODIUM CHLORIDE, PRESERVATIVE FREE 10 ML: 5 INJECTION INTRAVENOUS at 20:35

## 2019-01-01 RX ADMIN — ASPIRIN 81 MG 81 MG: 81 TABLET ORAL at 09:32

## 2019-01-01 RX ADMIN — ENOXAPARIN SODIUM 30 MG: 30 INJECTION SUBCUTANEOUS at 09:44

## 2019-01-01 RX ADMIN — METOPROLOL TARTRATE 25 MG: 25 TABLET ORAL at 08:58

## 2019-01-01 RX ADMIN — ASPIRIN 81 MG 81 MG: 81 TABLET ORAL at 09:24

## 2019-01-01 RX ADMIN — CEFAZOLIN SODIUM 1 G: 1 INJECTION, SOLUTION INTRAVENOUS at 15:18

## 2019-01-01 RX ADMIN — BACITRACIN, NEOMYCIN, POLYMYXIN B: 400; 3.5; 5 OINTMENT TOPICAL at 10:08

## 2019-01-01 RX ADMIN — RISPERIDONE 0.25 MG: 0.5 TABLET, FILM COATED ORAL at 15:26

## 2019-01-01 RX ADMIN — HYDROMORPHONE HYDROCHLORIDE 1 MG: 2 INJECTION, SOLUTION INTRAMUSCULAR; INTRAVENOUS; SUBCUTANEOUS at 18:18

## 2019-01-01 RX ADMIN — ASPIRIN 81 MG 81 MG: 81 TABLET ORAL at 08:17

## 2019-01-01 RX ADMIN — VANCOMYCIN HYDROCHLORIDE 1250 MG: 5 INJECTION, POWDER, LYOPHILIZED, FOR SOLUTION INTRAVENOUS at 13:29

## 2019-01-01 RX ADMIN — SODIUM CHLORIDE, PRESERVATIVE FREE 10 ML: 5 INJECTION INTRAVENOUS at 20:06

## 2019-01-01 RX ADMIN — SODIUM CHLORIDE, PRESERVATIVE FREE 10 ML: 5 INJECTION INTRAVENOUS at 20:27

## 2019-01-01 RX ADMIN — CIPROFLOXACIN 400 MG: 2 INJECTION, SOLUTION INTRAVENOUS at 20:19

## 2019-01-01 RX ADMIN — NITROFURANTOIN MONOHYDRATE/MACROCRYSTALLINE 100 MG: 25; 75 CAPSULE ORAL at 09:43

## 2019-01-01 RX ADMIN — CEFAZOLIN SODIUM 1 G: 1 INJECTION, SOLUTION INTRAVENOUS at 10:17

## 2019-01-01 RX ADMIN — FUROSEMIDE 20 MG: 10 INJECTION, SOLUTION INTRAVENOUS at 18:04

## 2019-01-01 RX ADMIN — CEFAZOLIN SODIUM 1 G: 1 INJECTION, SOLUTION INTRAVENOUS at 12:09

## 2019-01-01 RX ADMIN — INSULIN LISPRO 2 UNITS: 100 INJECTION, SOLUTION INTRAVENOUS; SUBCUTANEOUS at 19:06

## 2019-01-01 RX ADMIN — SODIUM CHLORIDE, PRESERVATIVE FREE 10 ML: 5 INJECTION INTRAVENOUS at 10:06

## 2019-01-01 RX ADMIN — DOCUSATE SODIUM 100 MG: 100 CAPSULE, LIQUID FILLED ORAL at 09:05

## 2019-01-01 RX ADMIN — SODIUM CHLORIDE, PRESERVATIVE FREE 10 ML: 5 INJECTION INTRAVENOUS at 21:53

## 2019-01-01 RX ADMIN — ENOXAPARIN SODIUM 30 MG: 30 INJECTION SUBCUTANEOUS at 10:18

## 2019-01-01 RX ADMIN — BACITRACIN, NEOMYCIN, POLYMYXIN B: 400; 3.5; 5 OINTMENT TOPICAL at 20:06

## 2019-01-01 RX ADMIN — INSULIN LISPRO 2 UNITS: 100 INJECTION, SOLUTION INTRAVENOUS; SUBCUTANEOUS at 20:37

## 2019-01-01 RX ADMIN — DEXTROSE MONOHYDRATE 12.5 G: 500 INJECTION PARENTERAL at 16:37

## 2019-01-01 RX ADMIN — IPRATROPIUM BROMIDE AND ALBUTEROL SULFATE 1 AMPULE: .5; 3 SOLUTION RESPIRATORY (INHALATION) at 16:03

## 2019-01-01 RX ADMIN — SODIUM CHLORIDE, PRESERVATIVE FREE 10 ML: 5 INJECTION INTRAVENOUS at 09:05

## 2019-01-01 RX ADMIN — INSULIN LISPRO 1 UNITS: 100 INJECTION, SOLUTION INTRAVENOUS; SUBCUTANEOUS at 21:00

## 2019-01-01 RX ADMIN — ENOXAPARIN SODIUM 30 MG: 30 INJECTION SUBCUTANEOUS at 09:00

## 2019-01-01 RX ADMIN — IPRATROPIUM BROMIDE AND ALBUTEROL SULFATE 1 AMPULE: .5; 3 SOLUTION RESPIRATORY (INHALATION) at 19:40

## 2019-01-01 RX ADMIN — NITROFURANTOIN MONOHYDRATE/MACROCRYSTALLINE 100 MG: 25; 75 CAPSULE ORAL at 11:06

## 2019-01-01 RX ADMIN — LORAZEPAM 0.5 MG: 2 INJECTION INTRAMUSCULAR; INTRAVENOUS at 00:30

## 2019-01-01 RX ADMIN — ENOXAPARIN SODIUM 30 MG: 30 INJECTION SUBCUTANEOUS at 10:17

## 2019-01-01 RX ADMIN — CEFAZOLIN SODIUM 1 G: 1 INJECTION, SOLUTION INTRAVENOUS at 08:18

## 2019-01-01 RX ADMIN — FAMOTIDINE 20 MG: 20 TABLET ORAL at 09:43

## 2019-01-01 RX ADMIN — SODIUM CHLORIDE, PRESERVATIVE FREE 10 ML: 5 INJECTION INTRAVENOUS at 09:44

## 2019-01-01 RX ADMIN — INSULIN LISPRO 1 UNITS: 100 INJECTION, SOLUTION INTRAVENOUS; SUBCUTANEOUS at 21:50

## 2019-01-01 RX ADMIN — CLOPIDOGREL BISULFATE 75 MG: 75 TABLET ORAL at 11:07

## 2019-01-01 RX ADMIN — CEFAZOLIN SODIUM 1 G: 1 INJECTION, SOLUTION INTRAVENOUS at 20:55

## 2019-01-01 RX ADMIN — IPRATROPIUM BROMIDE AND ALBUTEROL SULFATE 1 AMPULE: .5; 3 SOLUTION RESPIRATORY (INHALATION) at 08:12

## 2019-01-01 RX ADMIN — SODIUM CHLORIDE: 9 INJECTION, SOLUTION INTRAVENOUS at 21:43

## 2019-01-01 RX ADMIN — AMLODIPINE BESYLATE 5 MG: 5 TABLET ORAL at 10:04

## 2019-01-01 RX ADMIN — IPRATROPIUM BROMIDE AND ALBUTEROL SULFATE 1 AMPULE: .5; 3 SOLUTION RESPIRATORY (INHALATION) at 19:46

## 2019-01-01 RX ADMIN — SODIUM CHLORIDE: 234 INJECTION INTRAMUSCULAR; INTRAVENOUS; SUBCUTANEOUS at 20:10

## 2019-01-01 RX ADMIN — IPRATROPIUM BROMIDE AND ALBUTEROL SULFATE 1 AMPULE: .5; 3 SOLUTION RESPIRATORY (INHALATION) at 12:20

## 2019-01-01 RX ADMIN — POLYETHYLENE GLYCOL 3350 17 G: 17 POWDER, FOR SOLUTION ORAL at 09:24

## 2019-01-01 RX ADMIN — AMLODIPINE BESYLATE 10 MG: 10 TABLET ORAL at 08:21

## 2019-01-01 RX ADMIN — ACETAMINOPHEN 650 MG: 325 TABLET ORAL at 10:05

## 2019-01-01 RX ADMIN — IPRATROPIUM BROMIDE AND ALBUTEROL SULFATE 1 AMPULE: .5; 3 SOLUTION RESPIRATORY (INHALATION) at 19:45

## 2019-01-01 RX ADMIN — INSULIN LISPRO 1 UNITS: 100 INJECTION, SOLUTION INTRAVENOUS; SUBCUTANEOUS at 16:19

## 2019-01-01 RX ADMIN — CEFAZOLIN SODIUM 1 G: 1 INJECTION, SOLUTION INTRAVENOUS at 20:16

## 2019-01-01 RX ADMIN — RISPERIDONE 0.25 MG: 0.5 TABLET, FILM COATED ORAL at 09:43

## 2019-01-01 RX ADMIN — INSULIN LISPRO 2 UNITS: 100 INJECTION, SOLUTION INTRAVENOUS; SUBCUTANEOUS at 20:51

## 2019-01-01 RX ADMIN — INSULIN LISPRO 1 UNITS: 100 INJECTION, SOLUTION INTRAVENOUS; SUBCUTANEOUS at 09:45

## 2019-01-01 RX ADMIN — SODIUM BICARBONATE 650 MG TABLET 650 MG: at 09:05

## 2019-01-01 RX ADMIN — ENOXAPARIN SODIUM 30 MG: 30 INJECTION SUBCUTANEOUS at 11:07

## 2019-01-01 RX ADMIN — SODIUM CHLORIDE, PRESERVATIVE FREE 10 ML: 5 INJECTION INTRAVENOUS at 10:14

## 2019-01-01 RX ADMIN — INSULIN LISPRO 2 UNITS: 100 INJECTION, SOLUTION INTRAVENOUS; SUBCUTANEOUS at 18:42

## 2019-01-01 RX ADMIN — SODIUM CHLORIDE, PRESERVATIVE FREE 10 ML: 5 INJECTION INTRAVENOUS at 23:44

## 2019-01-01 RX ADMIN — BACITRACIN, NEOMYCIN, POLYMYXIN B: 400; 3.5; 5 OINTMENT TOPICAL at 20:33

## 2019-01-01 RX ADMIN — INSULIN LISPRO 2 UNITS: 100 INJECTION, SOLUTION INTRAVENOUS; SUBCUTANEOUS at 22:36

## 2019-01-01 RX ADMIN — CEFAZOLIN SODIUM 1 G: 1 INJECTION, SOLUTION INTRAVENOUS at 20:35

## 2019-01-01 RX ADMIN — MORPHINE SULFATE 2 MG: 4 INJECTION, SOLUTION INTRAMUSCULAR; INTRAVENOUS at 22:03

## 2019-01-01 RX ADMIN — INSULIN LISPRO 1 UNITS: 100 INJECTION, SOLUTION INTRAVENOUS; SUBCUTANEOUS at 17:14

## 2019-01-01 RX ADMIN — FAMOTIDINE 20 MG: 20 TABLET ORAL at 10:21

## 2019-01-01 RX ADMIN — ENOXAPARIN SODIUM 30 MG: 30 INJECTION SUBCUTANEOUS at 10:05

## 2019-01-01 RX ADMIN — FUROSEMIDE 40 MG: 10 INJECTION, SOLUTION INTRAMUSCULAR; INTRAVENOUS at 11:00

## 2019-01-01 RX ADMIN — CEFEPIME HYDROCHLORIDE 1 G: 1 INJECTION, POWDER, FOR SOLUTION INTRAMUSCULAR; INTRAVENOUS at 23:59

## 2019-01-01 RX ADMIN — INSULIN LISPRO 2 UNITS: 100 INJECTION, SOLUTION INTRAVENOUS; SUBCUTANEOUS at 12:47

## 2019-01-01 RX ADMIN — INSULIN LISPRO 2 UNITS: 100 INJECTION, SOLUTION INTRAVENOUS; SUBCUTANEOUS at 20:17

## 2019-01-01 RX ADMIN — CIPROFLOXACIN 400 MG: 2 INJECTION, SOLUTION INTRAVENOUS at 17:59

## 2019-01-01 RX ADMIN — IPRATROPIUM BROMIDE AND ALBUTEROL SULFATE 1 AMPULE: .5; 3 SOLUTION RESPIRATORY (INHALATION) at 15:49

## 2019-01-01 RX ADMIN — INSULIN LISPRO 2 UNITS: 100 INJECTION, SOLUTION INTRAVENOUS; SUBCUTANEOUS at 12:24

## 2019-01-01 RX ADMIN — ASPIRIN 81 MG 81 MG: 81 TABLET ORAL at 09:01

## 2019-01-01 RX ADMIN — FAMOTIDINE 20 MG: 20 TABLET ORAL at 09:05

## 2019-01-01 RX ADMIN — SODIUM CHLORIDE: 4.5 INJECTION, SOLUTION INTRAVENOUS at 11:37

## 2019-01-01 RX ADMIN — IPRATROPIUM BROMIDE AND ALBUTEROL SULFATE 1 AMPULE: .5; 3 SOLUTION RESPIRATORY (INHALATION) at 20:55

## 2019-01-01 RX ADMIN — SODIUM CHLORIDE: 4.5 INJECTION, SOLUTION INTRAVENOUS at 14:04

## 2019-01-01 RX ADMIN — ASPIRIN 81 MG 81 MG: 81 TABLET ORAL at 09:43

## 2019-01-01 RX ADMIN — ASPIRIN 81 MG 81 MG: 81 TABLET ORAL at 10:05

## 2019-01-01 RX ADMIN — ACETAMINOPHEN 650 MG: 325 TABLET ORAL at 12:30

## 2019-01-01 RX ADMIN — INSULIN LISPRO 1 UNITS: 100 INJECTION, SOLUTION INTRAVENOUS; SUBCUTANEOUS at 16:47

## 2019-01-01 RX ADMIN — DEXTROSE MONOHYDRATE: 50 INJECTION, SOLUTION INTRAVENOUS at 01:24

## 2019-01-01 RX ADMIN — IPRATROPIUM BROMIDE AND ALBUTEROL SULFATE 1 AMPULE: .5; 3 SOLUTION RESPIRATORY (INHALATION) at 19:51

## 2019-01-01 RX ADMIN — FAMOTIDINE 20 MG: 20 TABLET ORAL at 08:58

## 2019-01-01 RX ADMIN — Medication 50 MEQ: at 12:42

## 2019-01-01 RX ADMIN — OXYBUTYNIN CHLORIDE 5 MG: 5 TABLET, EXTENDED RELEASE ORAL at 09:43

## 2019-01-01 RX ADMIN — SODIUM CHLORIDE, PRESERVATIVE FREE 10 ML: 5 INJECTION INTRAVENOUS at 10:43

## 2019-01-01 RX ADMIN — SODIUM CHLORIDE, PRESERVATIVE FREE 10 ML: 5 INJECTION INTRAVENOUS at 20:50

## 2019-01-01 RX ADMIN — IPRATROPIUM BROMIDE AND ALBUTEROL SULFATE 1 AMPULE: .5; 3 SOLUTION RESPIRATORY (INHALATION) at 20:03

## 2019-01-01 RX ADMIN — DEXTROSE AND SODIUM CHLORIDE: 5; 450 INJECTION, SOLUTION INTRAVENOUS at 22:06

## 2019-01-01 RX ADMIN — INSULIN LISPRO 3 UNITS: 100 INJECTION, SOLUTION INTRAVENOUS; SUBCUTANEOUS at 17:07

## 2019-01-01 RX ADMIN — FUROSEMIDE 20 MG: 10 INJECTION, SOLUTION INTRAVENOUS at 09:04

## 2019-01-01 RX ADMIN — INSULIN LISPRO 1 UNITS: 100 INJECTION, SOLUTION INTRAVENOUS; SUBCUTANEOUS at 17:16

## 2019-01-01 RX ADMIN — VANCOMYCIN HYDROCHLORIDE 1000 MG: 1 INJECTION, SOLUTION INTRAVENOUS at 12:39

## 2019-01-01 RX ADMIN — ENOXAPARIN SODIUM 30 MG: 30 INJECTION SUBCUTANEOUS at 08:21

## 2019-01-01 RX ADMIN — INSULIN LISPRO 2 UNITS: 100 INJECTION, SOLUTION INTRAVENOUS; SUBCUTANEOUS at 04:53

## 2019-01-01 RX ADMIN — CLONIDINE HYDROCHLORIDE 0.1 MG: 0.1 TABLET ORAL at 05:46

## 2019-01-01 RX ADMIN — SODIUM CHLORIDE, PRESERVATIVE FREE 10 ML: 5 INJECTION INTRAVENOUS at 09:16

## 2019-01-01 RX ADMIN — INSULIN LISPRO 2 UNITS: 100 INJECTION, SOLUTION INTRAVENOUS; SUBCUTANEOUS at 01:08

## 2019-01-01 RX ADMIN — LORAZEPAM 0.5 MG: 2 INJECTION INTRAMUSCULAR; INTRAVENOUS at 21:51

## 2019-01-01 RX ADMIN — INSULIN LISPRO 1 UNITS: 100 INJECTION, SOLUTION INTRAVENOUS; SUBCUTANEOUS at 09:06

## 2019-01-01 RX ADMIN — INSULIN LISPRO 1 UNITS: 100 INJECTION, SOLUTION INTRAVENOUS; SUBCUTANEOUS at 23:23

## 2019-01-01 RX ADMIN — SODIUM CHLORIDE, PRESERVATIVE FREE 10 ML: 5 INJECTION INTRAVENOUS at 11:00

## 2019-01-01 RX ADMIN — INSULIN LISPRO 1 UNITS: 100 INJECTION, SOLUTION INTRAVENOUS; SUBCUTANEOUS at 13:17

## 2019-01-01 RX ADMIN — METOPROLOL TARTRATE 25 MG: 25 TABLET ORAL at 20:01

## 2019-01-01 RX ADMIN — OXYBUTYNIN CHLORIDE 5 MG: 5 TABLET, EXTENDED RELEASE ORAL at 09:05

## 2019-01-01 RX ADMIN — INSULIN LISPRO 3 UNITS: 100 INJECTION, SOLUTION INTRAVENOUS; SUBCUTANEOUS at 20:37

## 2019-01-01 RX ADMIN — ENOXAPARIN SODIUM 30 MG: 30 INJECTION SUBCUTANEOUS at 09:32

## 2019-01-01 RX ADMIN — SODIUM CHLORIDE: 9 INJECTION, SOLUTION INTRAVENOUS at 08:25

## 2019-01-01 RX ADMIN — OXYBUTYNIN CHLORIDE 5 MG: 5 TABLET, EXTENDED RELEASE ORAL at 11:06

## 2019-01-01 RX ADMIN — INSULIN LISPRO 2 UNITS: 100 INJECTION, SOLUTION INTRAVENOUS; SUBCUTANEOUS at 16:33

## 2019-01-01 RX ADMIN — VANCOMYCIN HYDROCHLORIDE 1000 MG: 1 INJECTION, SOLUTION INTRAVENOUS at 21:29

## 2019-01-01 RX ADMIN — FAMOTIDINE 20 MG: 20 TABLET ORAL at 08:21

## 2019-01-01 RX ADMIN — INSULIN LISPRO 3 UNITS: 100 INJECTION, SOLUTION INTRAVENOUS; SUBCUTANEOUS at 15:18

## 2019-01-01 RX ADMIN — INSULIN LISPRO 1 UNITS: 100 INJECTION, SOLUTION INTRAVENOUS; SUBCUTANEOUS at 08:18

## 2019-01-01 RX ADMIN — FAMOTIDINE 20 MG: 20 TABLET ORAL at 08:17

## 2019-01-01 RX ADMIN — OXYBUTYNIN CHLORIDE 5 MG: 5 TABLET, EXTENDED RELEASE ORAL at 10:21

## 2019-01-01 RX ADMIN — SODIUM CHLORIDE, PRESERVATIVE FREE 10 ML: 5 INJECTION INTRAVENOUS at 10:52

## 2019-01-01 RX ADMIN — FAMOTIDINE 20 MG: 20 TABLET ORAL at 09:01

## 2019-01-01 RX ADMIN — DEXTROSE MONOHYDRATE: 50 INJECTION, SOLUTION INTRAVENOUS at 00:30

## 2019-01-01 RX ADMIN — SODIUM CHLORIDE, PRESERVATIVE FREE 10 ML: 5 INJECTION INTRAVENOUS at 22:15

## 2019-01-01 RX ADMIN — CEFAZOLIN SODIUM 1 G: 1 INJECTION, SOLUTION INTRAVENOUS at 20:29

## 2019-01-01 RX ADMIN — ENOXAPARIN SODIUM 30 MG: 30 INJECTION SUBCUTANEOUS at 10:57

## 2019-01-01 RX ADMIN — INSULIN LISPRO 1 UNITS: 100 INJECTION, SOLUTION INTRAVENOUS; SUBCUTANEOUS at 17:55

## 2019-01-01 RX ADMIN — SODIUM CHLORIDE: 9 INJECTION, SOLUTION INTRAVENOUS at 21:50

## 2019-01-01 RX ADMIN — INSULIN LISPRO 1 UNITS: 100 INJECTION, SOLUTION INTRAVENOUS; SUBCUTANEOUS at 12:22

## 2019-01-01 RX ADMIN — POLYETHYLENE GLYCOL 3350 17 G: 17 POWDER, FOR SOLUTION ORAL at 08:58

## 2019-01-01 RX ADMIN — INSULIN HUMAN 10 UNITS: 100 INJECTION, SOLUTION PARENTERAL at 12:42

## 2019-01-01 RX ADMIN — SODIUM CHLORIDE 250 ML: 9 INJECTION, SOLUTION INTRAVENOUS at 16:39

## 2019-01-01 RX ADMIN — INSULIN LISPRO 2 UNITS: 100 INJECTION, SOLUTION INTRAVENOUS; SUBCUTANEOUS at 19:08

## 2019-01-01 RX ADMIN — SODIUM CHLORIDE: 234 INJECTION INTRAMUSCULAR; INTRAVENOUS; SUBCUTANEOUS at 09:30

## 2019-01-01 RX ADMIN — METOPROLOL TARTRATE 25 MG: 25 TABLET ORAL at 20:36

## 2019-01-01 RX ADMIN — POLYETHYLENE GLYCOL 3350 17 G: 17 POWDER, FOR SOLUTION ORAL at 09:01

## 2019-01-01 RX ADMIN — BACITRACIN, NEOMYCIN, POLYMYXIN B: 400; 3.5; 5 OINTMENT TOPICAL at 10:18

## 2019-01-01 RX ADMIN — ASPIRIN 81 MG 81 MG: 81 TABLET ORAL at 09:51

## 2019-01-01 RX ADMIN — INSULIN LISPRO 1 UNITS: 100 INJECTION, SOLUTION INTRAVENOUS; SUBCUTANEOUS at 20:03

## 2019-01-01 RX ADMIN — AMLODIPINE BESYLATE 5 MG: 5 TABLET ORAL at 18:25

## 2019-01-01 RX ADMIN — SODIUM CHLORIDE, PRESERVATIVE FREE 10 ML: 5 INJECTION INTRAVENOUS at 20:05

## 2019-01-01 RX ADMIN — ACETAMINOPHEN 650 MG: 325 TABLET ORAL at 10:23

## 2019-01-01 RX ADMIN — DEXTROSE MONOHYDRATE: 50 INJECTION, SOLUTION INTRAVENOUS at 09:23

## 2019-01-01 RX ADMIN — DOCUSATE SODIUM 100 MG: 100 CAPSULE, LIQUID FILLED ORAL at 10:12

## 2019-01-01 RX ADMIN — SODIUM BICARBONATE 650 MG TABLET 650 MG: at 09:43

## 2019-01-01 RX ADMIN — SODIUM CHLORIDE 1000 ML: 9 INJECTION, SOLUTION INTRAVENOUS at 14:55

## 2019-01-01 RX ADMIN — SODIUM CHLORIDE, PRESERVATIVE FREE 10 ML: 5 INJECTION INTRAVENOUS at 21:39

## 2019-01-01 RX ADMIN — ENOXAPARIN SODIUM 30 MG: 30 INJECTION SUBCUTANEOUS at 09:04

## 2019-01-01 RX ADMIN — CLONIDINE HYDROCHLORIDE 0.1 MG: 0.1 TABLET ORAL at 04:02

## 2019-01-01 RX ADMIN — CLOPIDOGREL BISULFATE 75 MG: 75 TABLET ORAL at 09:43

## 2019-01-01 RX ADMIN — CEFEPIME HYDROCHLORIDE 1 G: 1 INJECTION, POWDER, FOR SOLUTION INTRAMUSCULAR; INTRAVENOUS at 10:51

## 2019-01-01 RX ADMIN — INSULIN LISPRO 2 UNITS: 100 INJECTION, SOLUTION INTRAVENOUS; SUBCUTANEOUS at 22:12

## 2019-01-01 RX ADMIN — IPRATROPIUM BROMIDE AND ALBUTEROL SULFATE 1 AMPULE: .5; 3 SOLUTION RESPIRATORY (INHALATION) at 08:09

## 2019-01-01 RX ADMIN — INSULIN LISPRO 5 UNITS: 100 INJECTION, SOLUTION INTRAVENOUS; SUBCUTANEOUS at 21:32

## 2019-01-01 RX ADMIN — FAMOTIDINE 20 MG: 20 TABLET ORAL at 09:32

## 2019-01-01 RX ADMIN — DEXTROSE MONOHYDRATE 12.5 G: 500 INJECTION PARENTERAL at 05:44

## 2019-01-01 RX ADMIN — SODIUM BICARBONATE 650 MG TABLET 650 MG: at 10:09

## 2019-01-01 RX ADMIN — CEFAZOLIN SODIUM 1 G: 1 INJECTION, SOLUTION INTRAVENOUS at 20:25

## 2019-01-01 RX ADMIN — INSULIN LISPRO 4 UNITS: 100 INJECTION, SOLUTION INTRAVENOUS; SUBCUTANEOUS at 04:30

## 2019-01-01 RX ADMIN — SODIUM CHLORIDE: 4.5 INJECTION, SOLUTION INTRAVENOUS at 03:50

## 2019-01-01 RX ADMIN — SODIUM CHLORIDE: 9 INJECTION, SOLUTION INTRAVENOUS at 19:05

## 2019-01-01 RX ADMIN — IPRATROPIUM BROMIDE AND ALBUTEROL SULFATE 1 AMPULE: .5; 3 SOLUTION RESPIRATORY (INHALATION) at 11:53

## 2019-01-01 RX ADMIN — SULFAMETHOXAZOLE AND TRIMETHOPRIM 1 TABLET: 800; 160 TABLET ORAL at 17:14

## 2019-01-01 RX ADMIN — SODIUM CHLORIDE, PRESERVATIVE FREE 10 ML: 5 INJECTION INTRAVENOUS at 08:59

## 2019-01-01 RX ADMIN — ENOXAPARIN SODIUM 30 MG: 30 INJECTION SUBCUTANEOUS at 09:56

## 2019-01-01 RX ADMIN — SODIUM CHLORIDE, PRESERVATIVE FREE 10 ML: 5 INJECTION INTRAVENOUS at 15:19

## 2019-01-01 RX ADMIN — SODIUM BICARBONATE 650 MG TABLET 650 MG: at 23:23

## 2019-01-01 RX ADMIN — SODIUM CHLORIDE, PRESERVATIVE FREE 10 ML: 5 INJECTION INTRAVENOUS at 22:26

## 2019-01-01 RX ADMIN — INSULIN LISPRO 1 UNITS: 100 INJECTION, SOLUTION INTRAVENOUS; SUBCUTANEOUS at 16:07

## 2019-01-01 RX ADMIN — METOPROLOL TARTRATE 25 MG: 25 TABLET ORAL at 20:25

## 2019-01-01 RX ADMIN — INSULIN LISPRO 2 UNITS: 100 INJECTION, SOLUTION INTRAVENOUS; SUBCUTANEOUS at 21:45

## 2019-01-01 RX ADMIN — ENOXAPARIN SODIUM 30 MG: 30 INJECTION SUBCUTANEOUS at 10:20

## 2019-01-01 RX ADMIN — VANCOMYCIN HYDROCHLORIDE 1000 MG: 1 INJECTION, SOLUTION INTRAVENOUS at 16:47

## 2019-01-01 RX ADMIN — CARVEDILOL 6.25 MG: 6.25 TABLET, FILM COATED ORAL at 08:21

## 2019-01-01 RX ADMIN — CLOPIDOGREL BISULFATE 75 MG: 75 TABLET ORAL at 09:44

## 2019-01-01 RX ADMIN — OXYBUTYNIN CHLORIDE 5 MG: 5 TABLET, EXTENDED RELEASE ORAL at 10:13

## 2019-01-01 RX ADMIN — FAMOTIDINE 20 MG: 20 TABLET ORAL at 10:12

## 2019-01-01 RX ADMIN — DEXTROSE MONOHYDRATE 12.5 G: 25 INJECTION, SOLUTION INTRAVENOUS at 13:03

## 2019-01-01 RX ADMIN — INSULIN LISPRO 1 UNITS: 100 INJECTION, SOLUTION INTRAVENOUS; SUBCUTANEOUS at 12:39

## 2019-01-01 RX ADMIN — INSULIN LISPRO 2 UNITS: 100 INJECTION, SOLUTION INTRAVENOUS; SUBCUTANEOUS at 03:41

## 2019-01-01 RX ADMIN — BACITRACIN, NEOMYCIN, POLYMYXIN B: 400; 3.5; 5 OINTMENT TOPICAL at 09:45

## 2019-01-01 RX ADMIN — POLYETHYLENE GLYCOL 3350 17 G: 17 POWDER, FOR SOLUTION ORAL at 08:18

## 2019-01-01 RX ADMIN — INSULIN HUMAN 8 UNITS: 100 INJECTION, SUSPENSION SUBCUTANEOUS at 15:26

## 2019-01-01 RX ADMIN — DEXTROSE MONOHYDRATE 12.5 G: 25 INJECTION, SOLUTION INTRAVENOUS at 09:35

## 2019-01-01 RX ADMIN — INSULIN LISPRO 1 UNITS: 100 INJECTION, SOLUTION INTRAVENOUS; SUBCUTANEOUS at 04:06

## 2019-01-01 RX ADMIN — LIDOCAINE HYDROCHLORIDE 100 MG: 20 INJECTION, SOLUTION INFILTRATION; PERINEURAL at 12:42

## 2019-01-01 RX ADMIN — SODIUM POLYSTYRENE SULFONATE 15 G: 1 POWDER ORAL; RECTAL at 03:44

## 2019-01-01 RX ADMIN — INSULIN LISPRO 1 UNITS: 100 INJECTION, SOLUTION INTRAVENOUS; SUBCUTANEOUS at 18:10

## 2019-01-01 RX ADMIN — ENOXAPARIN SODIUM 30 MG: 30 INJECTION SUBCUTANEOUS at 10:52

## 2019-01-01 RX ADMIN — ENOXAPARIN SODIUM 30 MG: 30 INJECTION SUBCUTANEOUS at 09:21

## 2019-01-01 RX ADMIN — NITROFURANTOIN MONOHYDRATE/MACROCRYSTALLINE 100 MG: 25; 75 CAPSULE ORAL at 21:02

## 2019-01-01 RX ADMIN — CEFAZOLIN SODIUM 1 G: 1 INJECTION, SOLUTION INTRAVENOUS at 20:05

## 2019-01-01 RX ADMIN — METOPROLOL TARTRATE 25 MG: 25 TABLET ORAL at 09:51

## 2019-01-01 RX ADMIN — CEFAZOLIN SODIUM 1 G: 1 INJECTION, SOLUTION INTRAVENOUS at 09:44

## 2019-01-01 RX ADMIN — INSULIN LISPRO 2 UNITS: 100 INJECTION, SOLUTION INTRAVENOUS; SUBCUTANEOUS at 16:38

## 2019-01-01 RX ADMIN — ONDANSETRON 4 MG: 2 INJECTION INTRAMUSCULAR; INTRAVENOUS at 20:06

## 2019-01-01 RX ADMIN — IPRATROPIUM BROMIDE AND ALBUTEROL SULFATE 1 AMPULE: .5; 3 SOLUTION RESPIRATORY (INHALATION) at 16:13

## 2019-01-01 RX ADMIN — NITROFURANTOIN MONOHYDRATE/MACROCRYSTALLINE 100 MG: 25; 75 CAPSULE ORAL at 22:11

## 2019-01-01 RX ADMIN — SODIUM CHLORIDE 250 ML: 9 INJECTION, SOLUTION INTRAVENOUS at 11:02

## 2019-01-01 RX ADMIN — CEFAZOLIN SODIUM 1 G: 1 INJECTION, SOLUTION INTRAVENOUS at 20:45

## 2019-01-01 RX ADMIN — DEXTROSE 15 G: 15 GEL ORAL at 05:22

## 2019-01-01 RX ADMIN — CEFAZOLIN SODIUM 1 G: 1 INJECTION, SOLUTION INTRAVENOUS at 16:49

## 2019-01-01 RX ADMIN — CEFEPIME HYDROCHLORIDE 2 G: 1 INJECTION, POWDER, FOR SOLUTION INTRAMUSCULAR; INTRAVENOUS at 13:50

## 2019-01-01 RX ADMIN — SODIUM CHLORIDE: 9 INJECTION, SOLUTION INTRAVENOUS at 05:24

## 2019-01-01 RX ADMIN — IPRATROPIUM BROMIDE AND ALBUTEROL SULFATE 1 AMPULE: .5; 3 SOLUTION RESPIRATORY (INHALATION) at 11:46

## 2019-01-01 RX ADMIN — CEFAZOLIN SODIUM 1 G: 1 INJECTION, SOLUTION INTRAVENOUS at 09:06

## 2019-01-01 RX ADMIN — FAMOTIDINE 20 MG: 20 TABLET ORAL at 10:04

## 2019-01-01 RX ADMIN — BACITRACIN, NEOMYCIN, POLYMYXIN B: 400; 3.5; 5 OINTMENT TOPICAL at 21:48

## 2019-01-01 RX ADMIN — RISPERIDONE 0.25 MG: 0.5 TABLET, FILM COATED ORAL at 22:12

## 2019-01-01 RX ADMIN — DAPTOMYCIN 400 MG: 500 INJECTION, POWDER, LYOPHILIZED, FOR SOLUTION INTRAVENOUS at 12:32

## 2019-01-01 RX ADMIN — ZIPRASIDONE MESYLATE 10 MG: 20 INJECTION, POWDER, LYOPHILIZED, FOR SOLUTION INTRAMUSCULAR at 11:11

## 2019-01-01 RX ADMIN — IPRATROPIUM BROMIDE AND ALBUTEROL SULFATE 1 AMPULE: .5; 3 SOLUTION RESPIRATORY (INHALATION) at 08:06

## 2019-01-01 RX ADMIN — SODIUM CHLORIDE, PRESERVATIVE FREE 10 ML: 5 INJECTION INTRAVENOUS at 20:55

## 2019-01-01 RX ADMIN — SODIUM BICARBONATE 650 MG TABLET 650 MG: at 11:06

## 2019-01-01 RX ADMIN — SODIUM CHLORIDE: 9 INJECTION, SOLUTION INTRAVENOUS at 23:40

## 2019-01-01 RX ADMIN — CEFAZOLIN SODIUM 1 G: 1 INJECTION, SOLUTION INTRAVENOUS at 11:37

## 2019-01-01 RX ADMIN — SODIUM CHLORIDE, PRESERVATIVE FREE 10 ML: 5 INJECTION INTRAVENOUS at 21:52

## 2019-01-01 RX ADMIN — INSULIN LISPRO 1 UNITS: 100 INJECTION, SOLUTION INTRAVENOUS; SUBCUTANEOUS at 00:20

## 2019-01-01 RX ADMIN — SODIUM CHLORIDE 500 ML: 9 INJECTION, SOLUTION INTRAVENOUS at 14:00

## 2019-01-01 RX ADMIN — INSULIN LISPRO 5 UNITS: 100 INJECTION, SOLUTION INTRAVENOUS; SUBCUTANEOUS at 17:57

## 2019-01-01 RX ADMIN — SODIUM CHLORIDE, PRESERVATIVE FREE 10 ML: 5 INJECTION INTRAVENOUS at 10:32

## 2019-01-01 RX ADMIN — METOPROLOL TARTRATE 25 MG: 25 TABLET ORAL at 10:05

## 2019-01-01 RX ADMIN — IOPAMIDOL 90 ML: 755 INJECTION, SOLUTION INTRAVENOUS at 18:18

## 2019-01-01 RX ADMIN — CLOPIDOGREL BISULFATE 75 MG: 75 TABLET ORAL at 09:05

## 2019-01-01 RX ADMIN — SODIUM CHLORIDE, PRESERVATIVE FREE 10 ML: 5 INJECTION INTRAVENOUS at 11:07

## 2019-01-01 RX ADMIN — SODIUM CHLORIDE, PRESERVATIVE FREE 10 ML: 5 INJECTION INTRAVENOUS at 09:47

## 2019-01-01 RX ADMIN — SODIUM CHLORIDE: 4.5 INJECTION, SOLUTION INTRAVENOUS at 04:24

## 2019-01-01 RX ADMIN — INSULIN LISPRO 2 UNITS: 100 INJECTION, SOLUTION INTRAVENOUS; SUBCUTANEOUS at 18:06

## 2019-01-01 RX ADMIN — GALLIUM CITRATE GA-67 5 MILLICURIE: 2 INJECTION INTRAVENOUS at 11:55

## 2019-01-01 RX ADMIN — SODIUM CHLORIDE, PRESERVATIVE FREE 10 ML: 5 INJECTION INTRAVENOUS at 08:19

## 2019-01-01 RX ADMIN — IPRATROPIUM BROMIDE AND ALBUTEROL SULFATE 1 AMPULE: .5; 3 SOLUTION RESPIRATORY (INHALATION) at 20:08

## 2019-01-01 RX ADMIN — SODIUM BICARBONATE 650 MG TABLET 650 MG: at 22:11

## 2019-01-01 RX ADMIN — SODIUM CHLORIDE, PRESERVATIVE FREE 10 ML: 5 INJECTION INTRAVENOUS at 20:08

## 2019-01-01 RX ADMIN — FAMOTIDINE 20 MG: 20 TABLET ORAL at 09:44

## 2019-01-01 RX ADMIN — FAMOTIDINE 20 MG: 20 TABLET ORAL at 09:24

## 2019-01-01 RX ADMIN — PROPOFOL 30 MG: 10 INJECTION, EMULSION INTRAVENOUS at 12:42

## 2019-01-01 RX ADMIN — ACETAMINOPHEN 650 MG: 160 SUSPENSION ORAL at 14:29

## 2019-01-01 RX ADMIN — LORAZEPAM 0.5 MG: 2 INJECTION INTRAMUSCULAR; INTRAVENOUS at 01:44

## 2019-01-01 RX ADMIN — INSULIN LISPRO 1 UNITS: 100 INJECTION, SOLUTION INTRAVENOUS; SUBCUTANEOUS at 12:35

## 2019-01-01 RX ADMIN — ENOXAPARIN SODIUM 30 MG: 30 INJECTION SUBCUTANEOUS at 09:01

## 2019-01-01 RX ADMIN — SODIUM BICARBONATE 650 MG TABLET 650 MG: at 20:26

## 2019-01-01 ASSESSMENT — PAIN SCALES - GENERAL
PAINLEVEL_OUTOF10: 0
PAINLEVEL_OUTOF10: 0
PAINLEVEL_OUTOF10: 8
PAINLEVEL_OUTOF10: 0
PAINLEVEL_OUTOF10: 5
PAINLEVEL_OUTOF10: 0
PAINLEVEL_OUTOF10: 0
PAINLEVEL_OUTOF10: 7
PAINLEVEL_OUTOF10: 8
PAINLEVEL_OUTOF10: 0
PAINLEVEL_OUTOF10: 1
PAINLEVEL_OUTOF10: 0
PAINLEVEL_OUTOF10: 9
PAINLEVEL_OUTOF10: 0
PAINLEVEL_OUTOF10: 10
PAINLEVEL_OUTOF10: 0
PAINLEVEL_OUTOF10: 3
PAINLEVEL_OUTOF10: 0
PAINLEVEL_OUTOF10: 8
PAINLEVEL_OUTOF10: 0
PAINLEVEL_OUTOF10: 8
PAINLEVEL_OUTOF10: 0
PAINLEVEL_OUTOF10: 0
PAINLEVEL_OUTOF10: 10
PAINLEVEL_OUTOF10: 0
PAINLEVEL_OUTOF10: 9
PAINLEVEL_OUTOF10: 0
PAINLEVEL_OUTOF10: 3
PAINLEVEL_OUTOF10: 0

## 2019-01-01 ASSESSMENT — PAIN SCALES - WONG BAKER
WONGBAKER_NUMERICALRESPONSE: 0
WONGBAKER_NUMERICALRESPONSE: 10
WONGBAKER_NUMERICALRESPONSE: 0
WONGBAKER_NUMERICALRESPONSE: 0
WONGBAKER_NUMERICALRESPONSE: 10
WONGBAKER_NUMERICALRESPONSE: 0
WONGBAKER_NUMERICALRESPONSE: 8
WONGBAKER_NUMERICALRESPONSE: 0

## 2019-01-01 ASSESSMENT — PAIN - FUNCTIONAL ASSESSMENT: PAIN_FUNCTIONAL_ASSESSMENT: ACTIVITIES ARE NOT PREVENTED

## 2019-01-01 ASSESSMENT — PAIN DESCRIPTION - ONSET
ONSET: ON-GOING
ONSET: PROGRESSIVE
ONSET: ON-GOING
ONSET: PROGRESSIVE

## 2019-01-01 ASSESSMENT — PAIN DESCRIPTION - LOCATION
LOCATION: BACK

## 2019-01-01 ASSESSMENT — PAIN DESCRIPTION - FREQUENCY
FREQUENCY: CONTINUOUS
FREQUENCY: INTERMITTENT

## 2019-01-01 ASSESSMENT — PAIN DESCRIPTION - ORIENTATION
ORIENTATION: LOWER
ORIENTATION: MID

## 2019-01-01 ASSESSMENT — PAIN DESCRIPTION - DESCRIPTORS
DESCRIPTORS: DISCOMFORT;CRUSHING
DESCRIPTORS: DISCOMFORT;CRUSHING
DESCRIPTORS: ACHING
DESCRIPTORS: CRUSHING;DISCOMFORT
DESCRIPTORS: DISCOMFORT;CRUSHING
DESCRIPTORS: ACHING

## 2019-01-01 ASSESSMENT — PAIN DESCRIPTION - PROGRESSION
CLINICAL_PROGRESSION: GRADUALLY WORSENING
CLINICAL_PROGRESSION: GRADUALLY IMPROVING

## 2019-01-01 ASSESSMENT — PAIN DESCRIPTION - PAIN TYPE
TYPE: ACUTE PAIN
TYPE: CHRONIC PAIN
TYPE: ACUTE PAIN

## 2019-01-15 PROBLEM — N39.0 URINARY TRACT INFECTION, SITE NOT SPECIFIED: Status: ACTIVE | Noted: 2019-01-01

## 2019-03-11 PROBLEM — J18.9 PNEUMONIA: Status: ACTIVE | Noted: 2019-01-01

## 2019-03-17 PROBLEM — B95.62 MRSA BACTEREMIA: Status: ACTIVE | Noted: 2019-01-01

## 2019-03-17 PROBLEM — A41.02 SEPSIS DUE TO METHICILLIN RESISTANT STAPHYLOCOCCUS AUREUS (MRSA) (HCC): Status: ACTIVE | Noted: 2019-01-01

## 2019-03-17 PROBLEM — R78.81 MRSA BACTEREMIA: Status: ACTIVE | Noted: 2019-01-01

## 2019-03-30 PROBLEM — I44.2 COMPLETE HEART BLOCK (HCC): Status: ACTIVE | Noted: 2019-01-01

## 2019-03-30 PROBLEM — S61.203A OPEN WOUND OF LEFT MIDDLE FINGER WITHOUT DAMAGE TO NAIL: Status: ACTIVE | Noted: 2019-01-01

## 2019-03-30 PROBLEM — M79.81: Status: ACTIVE | Noted: 2019-01-01

## 2019-03-30 PROBLEM — R57.0 CARDIOGENIC SHOCK (HCC): Status: ACTIVE | Noted: 2019-01-01

## 2019-03-30 NOTE — H&P
Internal Medicine  History and Physical    Patient:  Marques Leblanc  MRN: [id number]    CHIEF COMPLAINT:  unresponsive    History Obtained From:  patient, spouse, family member - two daughters and brother, electronic medical record  PCP: Cintia Birmingham MD    HISTORY OF PRESENT ILLNESS:   The patient is a 82 y.o. female hx of recent extensive medical care w last admission 3/11-3/27 discharged to SNF earlier this week after MRSA pneumonia and sepsis, also thoracic spinal epidural abscess. Was very weak and debilitated at SNF but conversive and in no acute distress w her family till suddenly   ~11am became lethargic and weak then unresponsive. Taken to ED, found to be in cardiogenic shock w hr ~30 and hypotensive to 60s. Has not had prior hx of cardiac dz per pt's family. Importantly, given her extensive illness and infection fr last admission family reiterated her DNR CC status and requested no heroic measures, no life support but was interested in IVF and possible transition to hospice. She is comfortable, will arouse to verbal stim but not follow command lying in cart in ED. Past Medical History:        Diagnosis Date    Breast cancer ([address] 8/27/2014    Diabetes mellitus [facility]     Essential hypertension, benign 9/5/2012    Hypertension     Other and unspecified hyperlipidemia 9/5/2012    Type I (juvenile type) diabetes mellitus without mention of complication, not stated as uncontrolled 9/5/2012       Past Surgical History:        Procedure Laterality Date    BREAST SURGERY  5/13/14    lumpectomy and sentinel node biopsy    FOOT SURGERY      HYSTERECTOMY         Medications Prior to Admission:    Prior to Admission medications    Medication Sig Start Date End Date Taking?  Authorizing Provider   insulin lispro (HUMALOG) 100 UNIT/ML injection vial Inject 0-3 Units into the skin nightly 3/27/19   Cintia Birmingham MD   insulin lispro (HUMALOG) 100 UNIT/ML injection vial Inject 0-6 Units into the skin 3 times daily (with meals) 3/27/19   Gabby Dempsey MD   carvedilol (COREG) 6.25 MG tablet Take 1 tablet by mouth 2 times daily (with meals) 3/27/19   Gabby Dempsey MD   amLODIPine (NORVASC) 10 MG tablet Take 1 tablet by mouth daily 3/28/19   Gabby Dempsey MD   neomycin-bacitracin-polymyxin (NEOSPORIN) 400-5-5000 ointment Apply topically 2 times daily. 3/27/19 4/6/19  Gabby Dempsey MD   polyethylene glycol Petty Donna) packet Take 17 g by mouth daily 3/28/19 4/27/19  Gabby Dempsey MD   famotidine (PEPCID) 20 MG tablet Take 20 mg by mouth 2 times daily    Historical Provider, MD   sodium bicarbonate 650 MG tablet Take 1 tablet by mouth 2 times daily 10/18/18   Gabby Dempsey MD   aspirin 81 MG chewable tablet Take 1 tablet by mouth daily 8/30/18   Gabby Dempsey MD   clopidogrel (PLAVIX) 75 MG tablet Take 1 tablet by mouth daily 8/30/18   Gabby Dempsey MD       Allergies:  Pcn [penicillins]    Social History:   TOBACCO:   reports that she has never smoked. She has never used smokeless tobacco.  ETOH:   reports that she does not drink alcohol. Family History:   History reviewed. No pertinent family history. REVIEW OF SYSTEMS:  Unable to elicit    Physical Exam:    Vitals: BP (!) 73/37   Pulse (!) 26   Resp 18   SpO2 100%   General appearance: unresponsive but comfortable in ED cart and with some spontaneous movement of extremities  Skin: sl pale  HEENT: Head: Normocephalic, no lesions, without obvious abnormality.   Neck: no adenopathy, supple, symmetrical, trachea midline and thyroid not enlarged, symmetric, no tenderness/mass/nodules  Lungs: clear to auscultation bilaterally  Heart: severe bradycardia  Abdomen: soft, non-tender; bowel sounds normal; no masses,  no organomegaly  Extremities: extremities normal, atraumatic, no cyanosis or edema  Neurologic: Mental status: unresponsive x arouses to verbal stim, not follow commands    CBC:   Recent Labs     03/29/19  0550   WBC 4.4   HGB 8.4*        BMP: Recent Labs     03/29/19  0550      K 4.7   CL 99   CO2 25   BUN 44*   CREATININE 2.7*   GLUCOSE 426*     Hepatic: No results for input(s): AST, ALT, ALB, BILITOT, ALKPHOS in the last 72 hours. Troponin: No results for input(s): TROPONINI in the last 72 hours. BNP: No results for input(s): BNP in the last 72 hours. Lipids: No results for input(s): CHOL, HDL in the last 72 hours. Invalid input(s): LDLCALCU  ABGs:   Lab Results   Component Value Date    PO2ART 80 03/14/2019    TVS6KKM 42.0 03/14/2019     INR: No results for input(s): INR in the last 72 hours. -----------------------------------------------------------------       Assessment and Plan     Patient Active Problem List   Diagnosis Code    TIA (transient ischemic attack) G45.9    Essential hypertension, benign I10    Other and unspecified hyperlipidemia E78.5    Type 1 diabetes mellitus ([facility] [address] E10.9    Breast cancer ([facility] [address] C50.919    Encephalopathy acute G93.40    Chronic kidney disease (CKD) stage G3a/A3, moderately decreased glomerular filtration rate (GFR) between 45-59 mL/min/1.73 square meter and albuminuria creatinine ratio greater than 300 mg/g ([facility]) N18.3    Hypercalcemia E83.52    DM (diabetes mellitus) ([facility]) E11.9    Acute encephalopathy M45.43    Metabolic encephalopathy L58.60    Urinary tract infection, site not specified N39.0    Pneumonia J18.9    MRSA bacteremia R78.81    Sepsis due to methicillin resistant Staphylococcus aureus (MRSA) ([facility]) A41.02    NEETA (acute kidney injury) ([facility] [address] N17.9    WD-Open wound of left middle finger without damage to nail S61.203A    WD-Nontraumatic hematoma of skin and subcutaneous tissue M79.81    Complete heart block ([facility]) I44.2    Cardiogenic shock ([facility]) R57.0     82yo BF now presenting w cardiogenic shock likely fr complete heart block. Family did wish to maintain DNR CC status.   Discussed w them that there could be a reversible problem causing this, and have permission from family to at least have consultation w Dr Princeton Mcburney whom I called to update. Regardless, in current critical condition will also proceed w admission to floor, since DNR CC plan no tele, keep NPO, also comfort meds w prn IV ativan and morphine and with consultation also to hospice service. If pt does endure through tomorrow may be candidate then for transition to inpatient hospice service.     Catherine Minor

## 2019-03-30 NOTE — ED NOTES
defibb pads removed. Lights dimmed for comfort. Warm blankets applied for comfort. Family updated on bed assignment and in agreement. Dr. Enma Vazquez made aware of temperature. Pt noted to be agonally breathing. RN will cont. To monitor.       Nicole Webber RN  03/30/19 1897

## 2019-03-30 NOTE — ED NOTES
Pt resting peacefully on ED cart with eyes closed. pts condition remain unchanged, symmetrical chest rise noted. ED Cart rails raised x2 for safety. RN will cont. To monitor.       Demond San RN  03/30/19 1476

## 2019-03-30 NOTE — ED NOTES
Dr Danny Mendiola St to bedside to discuss with family plan of care, [facility] upheld.       Renna Boxer Arachania, RN  03/30/19 9021

## 2019-03-30 NOTE — ED NOTES
Bed: 01TR-01  Expected date:   Expected time:   Means of arrival:   Comments:  Luis Daniel Gonzalez, [facility]  03/30/19 5189

## 2019-03-30 NOTE — ED NOTES
[address] [facility] to come get the pt      Ash Love RN  03/30/19 1923

## 2019-03-30 NOTE — ED PROVIDER NOTES
file     Gets together: Not on file     Attends Anabaptism service: Not on file     Active member of club or organization: Not on file     Attends meetings of clubs or organizations: Not on file     Relationship status: Not on file    Intimate partner violence:     Fear of current or ex partner: Not on file     Emotionally abused: Not on file     Physically abused: Not on file     Forced sexual activity: Not on file   Other Topics Concern    Not on file   Social History Narrative    Not on file     No current facility-administered medications for this encounter. Current Outpatient Medications   Medication Sig Dispense Refill    insulin lispro (HUMALOG) 100 UNIT/ML injection vial Inject 0-3 Units into the skin nightly 1 vial 3    insulin lispro (HUMALOG) 100 UNIT/ML injection vial Inject 0-6 Units into the skin 3 times daily (with meals) 1 vial 3    carvedilol (COREG) 6.25 MG tablet Take 1 tablet by mouth 2 times daily (with meals) 60 tablet 3    amLODIPine (NORVASC) 10 MG tablet Take 1 tablet by mouth daily 30 tablet 3    neomycin-bacitracin-polymyxin (NEOSPORIN) 400-5-5000 ointment Apply topically 2 times daily.  polyethylene glycol (GLYCOLAX) packet Take 17 g by mouth daily 527 g 1    famotidine (PEPCID) 20 MG tablet Take 20 mg by mouth 2 times daily      sodium bicarbonate 650 MG tablet Take 1 tablet by mouth 2 times daily 60 tablet 11    aspirin 81 MG chewable tablet Take 1 tablet by mouth daily 30 tablet 3    clopidogrel (PLAVIX) 75 MG tablet Take 1 tablet by mouth daily 30 tablet 3     Allergies   Allergen Reactions    Pcn [Penicillins] Hives       Nursing Notes Reviewed    Physical Exam:  ED Triage Vitals [03/30/19 1504]   Enc Vitals Group      BP (!) 62/30      Pulse (!) 32      Resp 20      Temp       Temp src       SpO2 99 %      Weight       Height       Head Circumference       Peak Flow       Pain Score       Pain Loc       Pain Edu? Excl. in [address]?       GENERAL APPEARANCE: Obtunded. Intermittently opens eyes and moves right upper extremity. No acute distress  HEAD: Normocephalic. Atraumatic. EYES: Sclera anicteric. ENT: Mucous membranes are moist. Tolerates saliva. No trismus. NECK: Supple. Trachea midline. HEART: Bradycardia. Radial pulses 2+. LUNGS: Respirations unlabored. CTAB  ABDOMEN: Soft. Non-tender. No guarding or rebound. EXTREMITIES: No acute deformities. SKIN: Warm and dry. NEUROLOGICAL: No gross facial drooping. I have reviewed and interpreted all of the currently available lab results from this visit (if applicable):  No results found for this visit on 03/30/19. Radiographs (if obtained):  [] The following radiograph was interpreted by myself in the absence of a radiologist:  [] Radiologist's Report Reviewed:    EKG (if obtained): (All EKG's are interpreted by myself in the absence of a cardiologist)    MDM:  Plan of care is discussed thoroughly with the patient and family if present. If performed, all imaging and lab work also discussed with patient. All relevant prior results and chart reviewed if available. Patient presents with changes in mental status, bradycardia, hypotension and hypothermia. She is a DNR CC. Extensive conversation with the patient's  and family who agree that they only want some IV fluids and to keep her comfortable. She is in no respiratory distress this time. She does appear to be either in a junctional rhythm or complete heart block. I called the patient's primary care provider for admission, plan for consultation with hospice. Family is agreeable with this plan of care. Clinical Impression:  1.  Altered mental status, unspecified altered mental status type      (Please note that portions of this note may have been completed with a voice recognition program. Efforts were made to edit the dictations but occasionally words are mis-transcribed.)    MD Henri Ayers MD  03/30/19 1633

## 2019-03-31 NOTE — CONSULTS
[address], [address] [facility]                                  CONSULTATION    PATIENT NAME: Eddy Amos                     :        1935  MED REC NO:   [id number]                          ROOM:       4129  ACCOUNT NO:   [de-identified]                           ADMIT DATE: 2019  PROVIDER:     Jose Rosario MD    CONSULT DATE:  2019    INDICATION:  Bradycardia. HISTORY OF PRESENT ILLNESS:  This is an 82-year-old female patient who  came to the nursing home, very lethargic, and hypotension. The  patient's family decided to make her comfort care only. So, she is more  with possibly hospice. She right now is very lethargic and  unresponsive. She has agonal breathing present. The vitals cannot be  obtained per nurses. The patient was seen in the hospital recently. She was found to have  osteomyelitis present. She was found to have on the MRI infection  present. She was recommended to go to [facility] for surgery and  further evaluation, but the family declined. She went to nursing home  and she comes back with being unresponsive, hypotensive, and  bradycardic. PAST MEDICAL HISTORY:  Breast cancer, diabetes, renal insufficiency,  hypertension, and hyperlipidemia present. PAST SURGICAL HISTORY:  Partial hysterectomy, right breast surgery, and  left breast surgery also. 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 ALLERGIES:  PENICILLIN. The patient had a KENNETH done recently and was found to have normal LV  function. No endocarditis was present. The patient also had a history of diabetes present and hypercalcemia  present. The patient has been treated with Zometa. SOCIAL HISTORY:  She does not smoke and does not drink. She is . PHYSICAL EXAMINATION:  GENERAL:  The patient is very lethargic and unresponsive.   VITAL SIGNS:  Temperature afebrile, pulse is in the 20s,

## 2019-03-31 NOTE — PROGRESS NOTES
IM Progress Note  3/31/2019 9:38 AM  Subjective:   Admit Date: 3/30/2019  PCP: Gabby Dempsey MD  Chief complaint- unresponsive      Interval History: not responsive this am, still breathing but has undetectable pulse and BP. Data:   Scheduled Meds:   sodium chloride flush  10 mL Intravenous 2 times per day     Continuous Infusions:   sodium chloride 75 mL/hr at 03/31/19 0524     PRN Meds:sodium chloride flush, ondansetron, LORazepam, morphine  I/O last 3 completed shifts: In: 648.8 [I.V.:648.8]  Out: 0   CBC:   Recent Labs     03/29/19  0550   WBC 4.4   HGB 8.4*        BMP:    Recent Labs     03/29/19  0550      K 4.7   CL 99   CO2 25   BUN 44*   CREATININE 2.7*   GLUCOSE 426*     Hepatic: No results for input(s): AST, ALT, ALB, BILITOT, ALKPHOS in the last 72 hours. Troponin: No results for input(s): TROPONINI in the last 72 hours. BNP: No results for input(s): BNP in the last 72 hours. INR: No results for input(s): INR in the last 72 hours. Objective:   Vitals: BP (!) 48/33   Pulse (!) 25   Temp (!) 90.9 °F (32.7 °C) (Rectal)   Resp 14 Comment: cheyne-de la cruz  Ht 5' 2.21\" (1.58 m)   Wt 141 lb 1.5 oz (64 kg)   SpO2 94%   BMI 25.64 kg/m²   General appearance: agonal breathing, comfortable/unresponsive       Assessment and Plan:   1. Cardiogenic shock w bradycardia- terminal status, maintain comfort care. Advised family she will very likely pass away the next few hrs. My condolences were given. Will check if they are interested in spiritual services. Nursing to notify me when she expires.       Patient Active Problem List:     TIA (transient ischemic attack)     Essential hypertension, benign     Other and unspecified hyperlipidemia     Type 1 diabetes mellitus (HCC)     Breast cancer (HCC)     Encephalopathy acute     Chronic kidney disease (CKD) stage G3a/A3, moderately decreased glomerular filtration rate (GFR) between 45-59 mL/min/1.73 square meter and albuminuria creatinine ratio greater than 300 mg/g (HCC)     Hypercalcemia     DM (diabetes mellitus) ([address]     Acute encephalopathy     Metabolic encephalopathy     Urinary tract infection, site not specified     Pneumonia     MRSA bacteremia     Sepsis due to methicillin resistant Staphylococcus aureus (MRSA) ([address]     NEETA (acute kidney injury) ([address]     WD-Open wound of left middle finger without damage to nail     WD-Nontraumatic hematoma of skin and subcutaneous tissue     Complete heart block ([address]     Cardiogenic shock ([address]     Altered mental status      Marino Newell MD

## 2019-03-31 NOTE — CARE COORDINATION
CM - pt at risk for readmission . Pt admitted under cardiogenic shock -had recently spent from 3/11 --thru 3/27 in the hospital for treatment for sepsis, pneumonia, and MRSA. Pt was discharged to the care of a SNF . Pt family made aware of the seriousness of pt situation -is a [facility] CC.  Pt came into ER as \"unresponsive\"  Pt admitted to hospital without need for CM at present --Baldo Buckner / Huma Jean / [facility]

## 2019-03-31 NOTE — PROGRESS NOTES
Daily Progress Note     patient is lethargic   Family at bedside  Hypotension and bradycardia   Hospice evaluation   Supportive care  Very minimal urine output on IVF  Possible septic shock   Will sign off as patient is comfort care       Objective:   BP (!) 48/33   Pulse (!) 25   Temp (!) 90.9 °F (32.7 °C) (Rectal)   Resp 14 Comment: cheyne-de la cruz  Ht 5' 2.21\" (1.58 m)   Wt 141 lb 1.5 oz (64 kg)   SpO2 94%   BMI 25.64 kg/m²       Intake/Output Summary (Last 24 hours) at 3/31/2019 1141  Last data filed at 3/31/2019 0606  Gross per 24 hour   Intake 648.75 ml   Output 0 ml   Net 648.75 ml       Medications:   Scheduled Meds:   sodium chloride flush  10 mL Intravenous 2 times per day      Infusions:   sodium chloride 75 mL/hr at 03/31/19 0524      PRN Meds:  sodium chloride flush, ondansetron, LORazepam, morphine       Physical Exam:  Vitals:    03/30/19 1945   BP:    Pulse: (!) 25   Resp: 14   Temp:    SpO2: 94%        General: AAO, NAD  Chest: Nontender  Cardiac: First and Second Heart Sounds are Normal, No Murmurs or Gallops noted  Lungs:Clear to auscultation and percussion. Abdomen: Soft, NT, ND, +BS  Extremities: No clubbing, no edema  Vascular:  Equal 2+ peripheral pulses. Lab Data:  CBC:   Recent Labs     03/29/19  0550   WBC 4.4   HGB 8.4*   HCT 27.0*   MCV 93.8        BMP:   Recent Labs     03/29/19  0550      K 4.7   CL 99   CO2 25   BUN 44*   CREATININE 2.7*     LIVER PROFILE: No results for input(s): AST, ALT, LIPASE, BILIDIR, BILITOT, ALKPHOS in the last 72 hours. Invalid input(s): AMYLASE,  ALB  PT/INR: No results for input(s): PROTIME, INR in the last 72 hours. APTT: No results for input(s): APTT in the last 72 hours. BNP:  No results for input(s): BNP in the last 72 hours.       Assessment:  Patient Active Problem List    Diagnosis Date Noted    WD-Open wound of left middle finger without damage to nail 03/30/2019     Priority: High    Complete heart block ([address] 03/30/2019     Priority: High    Cardiogenic shock [facility] 03/30/2019     Priority: High    WD-Nontraumatic hematoma of skin and subcutaneous tissue 03/30/2019     Priority: Medium    Altered mental status     NEETA (acute kidney injury) ([facility]     MRSA bacteremia 03/17/2019    Sepsis due to methicillin resistant Staphylococcus aureus (MRSA) ([facility] 03/17/2019    Pneumonia 03/11/2019    Urinary tract infection, site not specified 01/15/2019    Acute encephalopathy 96/17/0863    Metabolic encephalopathy 71/06/4158    Chronic kidney disease (CKD) stage G3a/A3, moderately decreased glomerular filtration rate (GFR) between 45-59 mL/min/1.73 square meter and albuminuria creatinine ratio greater than 300 mg/g (HCC) 10/01/2018    Hypercalcemia 10/01/2018    DM (diabetes mellitus) ([facility] 10/01/2018    Encephalopathy acute 08/25/2018    Breast cancer ([facility] 08/27/2014    TIA (transient ischemic attack) 09/05/2012    Essential hypertension, benign 09/05/2012    Other and unspecified hyperlipidemia 09/05/2012    Type 1 diabetes mellitus ([facility] 09/05/2012       Ger Tomas MD 3/31/2019 11:41 AM

## 2019-04-01 PROBLEM — G06.1 ABSCESS IN EPIDURAL SPACE OF THORACIC SPINE: Status: ACTIVE | Noted: 2019-01-01

## 2019-04-01 NOTE — CONSULTS
[address]    See my consult note from earlier today.   Chapo Dorado MD, [facility]

## 2019-04-01 NOTE — H&P
[address] [place] [address]+    Date: 4/1/2019  Name: Newt Claude  MRN: [id number]   YOB: 1935   Patient's PCP: Mar Correia MD  Referring Physician: Dr. Kathie Hernandez care admission date: 3/30 to 4/1/2019 and 3/11 to 3/27/2019 with discharge to [facility] and 1/15 to 1/21/19   Admit to General Inpatient Hospice: 4/1/2019    Informant: Chart reviewed, discussed with Dr. Shayna Velez and Dr Mar Correia, as well as the hospice nurse liaison and the patient's nurse. The patient's  arrived during my examination, and I was able to talk to him with Dr Mar Correia. No history is able to be obtained from the patient due to her clinical status    CC: decreased responsiveness, low BP and heart rate    [place]: This is a 82 y.o. female re-admitted on 3/30/2019 with altered mental status, hypotension with Systolic blood pressure  In the 60's and bradycardia. The patient has a history of dementia, DM3 with neuropathy, nephropathy and retinopathy. The patient had a prolonged recent hospitalization with MRSA bacteremia, and was found to have a thoracic spine epidural abscess. There is a calloused area on the tip of the left long finger, that was debrided. The patient did not show any vegetations on Transesophageal echocardiogram. She had multiple consultants. Dr Mar Correia arranged transfer to a tertiary care hospital for neurosurgical consultation, and the patient's  and daughter declined that later. The patient was transferred to the [address] [place] [address] on 3/27/19, and the patient's spouse reports that she had a good couple of days, being sent back due to decreased alertness, hypotension and bradycardia. The patient did have a tagged white cell scan, showing uptake in the mid thoracic spine, thighs, and right shoulder. The patient had a nasogastric tube placed on 3/16/19, and is now on tube feeding, which she is tolerating.  The patient's serum albumin is 2.6 gm/dl. The patient is minimally responsive, but briefly tried to open her eyes. Hospice information and philosophy was provided during the last admission to the patient's spouse, however, he declined. We again discussed Hospice philosophy regarding care and comfort at the end of life. Questions were answered, and emotional support was provided. The patient's spouse wants to minimize the use of sedating medications, and we discussed that the patient has only had 2 doses of Morphine 1 mg IV, the last dose being over 8 hours ago. The patient does have some moaning, and we discussed comfort. The patient spouse and family met with hospice, and agree to comfort focused care, and General Inpatient Hospice.      Past Medical History:   Diagnosis Date    Breast cancer ([facility] 8/27/2014    Diabetes mellitus ([facility]     Essential hypertension, benign 9/5/2012    Hypertension     Other and unspecified hyperlipidemia 9/5/2012    Type I (juvenile type) diabetes mellitus without mention of complication, not stated as uncontrolled 9/5/2012       Past Surgical History:   Procedure Laterality Date    BREAST SURGERY  5/13/14    lumpectomy and sentinel node biopsy    FOOT SURGERY      HYSTERECTOMY         Social History     Socioeconomic History    Marital status:      Spouse name: Not on file    Number of children: Not on file    Years of education: Not on file    Highest education level: Not on file   Occupational History    Not on file   Social Needs    Financial resource strain: Not on file    Food insecurity:     Worry: Not on file     Inability: Not on file    Transportation needs:     Medical: Not on file     Non-medical: Not on file   Tobacco Use    Smoking status: Never Smoker    Smokeless tobacco: Never Used   Substance and Sexual Activity    Alcohol use: No    Drug use: No    Sexual activity: Not on file   Lifestyle    Physical activity:     Days per week: Not on file     Minutes systems are unobtainable due to the patient's clinical condition     Weight:    Wt Readings from Last 3 Encounters:   03/30/19 141 lb 1.5 oz (64 kg)   03/27/19 141 lb 1.5 oz (64 kg)   02/26/19 130 lb (59 kg)       Data reviewed 4/1/19:  MRI of thoracolumbar spine 3/26/19:  Thoracic spine:       Multifocal heterogeneous signal in the epidural space of the canal beginning   just below the T5-6 disc space extending down to the T10-11 disc space.  This   is consistent with an epidural abscess.  Throughout this level, there is   limitation of the cord signal evaluation due to artifact.  Subtle cord edema   would be difficult to exclude.  There is no definitive cord edema noted on   the axial images.  There is associated multilevel canal narrowing and cord   flattening.       Disproportionate edema is noted in the T6 and T7 vertebral bodies raising the   question of osteomyelitis.  Left paraspinal phlegmonous tissue is also   suggested.       Lumbar spine       multilevel degenerative disc disease in the lumbar spine as described.  No   finding to suggest discitis or osteomyelitis in the lumbar spine.  See above   for details of each level         Transesophageal echocardiogram 3/11/19:   Left ventricular systolic function is normal. EF 60%   There is no evidence of thrombus in the Left Atrial Appendage.   Normal aortic valve structure and function.   Mild mitral regurgitation is present.   Mild tricuspid regurgitation is present.   NO VEGETATION NOTED IN ANY VALVE    XRay hand  1. No evidence of osteomyelitis with attention to the third finger. 2. No fracture. CT chest and abdomen 3/13/19  1. Cardiomegaly with small bilateral pleural effusions and adjacent  atelectasis. 2. Extensive bilateral perihilar consolidations are seen, likely related to  pulmonary edema, atypical or multifocal pneumonia. 3. Esophageal reflux. 4. Left-sided pelvicaliectasis. No obstructing calculus is seen.   Findings could be related to thickening of the bladder wall from cystitis. Follow-up  imaging with CT urogram can be obtained. XR abdomen  NG tube tip projects over the body of the stomach. Transthoracic Echocardiogram 3/15/19   Left ventricular systolic function is normal.   Ejection fraction is visually estimated at 55-60%. Mild concentric left ventricular hypertrophy. No regional wall motion abnormalities were detected. Essentially normal chamber sizes. Moderate tricuspid regurgitation. RVSP is 46 mmHg. Moderate Pulmonary HTN. No evidence of any pericardial effusion. Mitral valve anterior leaflet cannot exclude a vegetation-mobile density   noted=if clinically indicated suggest a KENNETH     Tagged white cell scan  Two foci of asymmetric right facial activity are seen, concerning for  localized inflammation or infection. Correlate with physical examination. Some considerations include dental disease, cellulitis, abscess, or recent  trauma. Focal activity at the midthoracic spine, for which active degenerative  inflammation, discitis/osteomyelitis or paraspinal soft tissue infection are  considerations. MR could be performed for further assessment if patient is  focally symptomatic. Asymmetric activity at the right shoulder, which can reflect asymmetric  inflammatory change or low-grade joint infection.     Subtle punctate foci of localized inflammation or infection within the  proximal left thigh and distal right thigh; correlate with physical exam.       Hepatic Function Panel:    Lab Results   Component Value Date    ALKPHOS 133 03/21/2019    ALT <5 03/21/2019    AST 23 03/21/2019    PROT 5.3 03/21/2019    PROT 7.3 09/05/2012    BILITOT 0.2 03/21/2019    LABALBU 2.6 03/26/2019    LABALBU 73 08/28/2018     CBC with Differential:    Lab Results   Component Value Date    WBC 4.4 03/29/2019    RBC 2.88 03/29/2019    HGB 8.4 03/29/2019    HCT 27.0 03/29/2019     03/29/2019    MCV 93.8 03/29/2019    MCH 29.2 03/29/2019    MCHC 31.1 03/29/2019    RDW 13.5 03/29/2019    SEGSPCT 83.2 03/19/2019    BANDSPCT 20 03/13/2019    LYMPHOPCT 5.8 03/19/2019    MONOPCT 6.1 03/19/2019    BASOPCT 0.2 03/19/2019    MONOSABS 0.6 03/19/2019    LYMPHSABS 0.6 03/19/2019    EOSABS 0.3 03/19/2019    BASOSABS 0.0 03/19/2019    DIFFTYPE AUTOMATED DIFFERENTIAL 03/19/2019     CMP:    Lab Results   Component Value Date     03/29/2019    K 4.7 03/29/2019    CL 99 03/29/2019    CO2 25 03/29/2019    BUN 44 03/29/2019    CREATININE 2.7 03/29/2019    GFRAA 20 03/29/2019    LABGLOM 17 03/29/2019    GLUCOSE 426 03/29/2019    PROT 5.3 03/21/2019    PROT 7.3 09/05/2012    LABALBU 2.6 03/26/2019    LABALBU 73 08/28/2018    CALCIUM 9.8 03/29/2019    BILITOT 0.2 03/21/2019    ALKPHOS 133 03/21/2019    AST 23 03/21/2019    ALT <5 03/21/2019       Physical Exam:   BP (!) 125/53   Pulse (!) 42   Temp (!) 90.9 °F (32.7 °C) (Rectal)   Resp 18   Ht 5' 2.21\" (1.58 m)   Wt 141 lb 1.5 oz (64 kg)   SpO2 97%   BMI 25.64 kg/m²    General: briefly opened eyes with my exam, occasional moaning  HEENT: Mucous membranes are dry, sclerae are clear, mild conjunctival pallor    Heart: distant tones, slow IRRR, S1S2, no murmurs  Lungs:  Distant breath sounds bilaterally, diminished at the bases, without rales, scattered rhonchi   Back: not examined  Abdomen: soft, bowel sounds quietly present, no apparent tenderness, nondistended  Extremities:  No mottling, + edema, there is a calloused area on the tip of the left long finger, which does not appear grossly infected  Neurologic: obtunded. Assessment/Plan:  1. Thoracic spinal epidural abscess with MRSA sepsis, hypotension and bradycardia. [facility], restlessness, and probable end of life care. 2. Encephalopathy with underlying demenia. 3. Malnutrition  4. 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 Hypertension  6. DM with neuropathy, retinopathy, nephropathy  7.  DNR-comfort care      Patient Active Problem List   Diagnosis Code    TIA (transient ischemic attack) G45.9    Essential hypertension, benign I10    Other and unspecified hyperlipidemia E78.5    Type 1 diabetes mellitus ([facility] E10.9    Breast cancer ([facility] C50.919    Encephalopathy acute G93.40    Chronic kidney disease (CKD) stage G3a/A3, moderately decreased glomerular filtration rate (GFR) between 45-59 mL/min/1.73 square meter and albuminuria creatinine ratio greater than 300 mg/g ([facility]) N18.3    Hypercalcemia E83.52    DM (diabetes mellitus) ([facility]) E11.9    Acute encephalopathy T63.59    Metabolic encephalopathy T38.59    Urinary tract infection, site not specified N39.0    Pneumonia J18.9    MRSA bacteremia R78.81    Sepsis due to methicillin resistant Staphylococcus aureus (MRSA) ([facility]) A41.02    NEETA (acute kidney injury) ([facility] N17.9    WD-Open wound of left middle finger without damage to nail S61.203A    WD-Nontraumatic hematoma of skin and subcutaneous tissue M79.81    Complete heart block (HCC) I44.2    Cardiogenic shock ([facility]) R57.0    Altered mental status R41.82    Abscess in epidural space of thoracic spine T93.4     Certification of Terminal Illness: I certify that this patient is eligible for Hospice services for a terminal diagnosis of Thoracic spinal epidural abscess with MRSA sepsis with a life expectancy predicted to be less than 6 months if this illness follows its expected course.     Han Yoder MD, [facility]

## 2019-04-01 NOTE — PROGRESS NOTES
INTERNAL MEDICINE PROGRESS NOTE        Cash Weir   1935   Primary Care Physician:  Gilbert Gan MD  Admit Date: 3/30/2019     Subjective:   Pt is very lethargic. Does not respond. Meds, labs and other notes reviewed. Discussed with Dr Fredo Hunter. Also met and discussed with pt's  while rounding today (Dr Fredo Hunter was also present). He had multiple questions and we answered all. He wants only comfort care and prefer to take her home as those were her wishes. Pt remains bradycardic and hypotensive. Objective:   BP (!) 102/51   Pulse (!) 41   Temp (!) 90.9 °F (32.7 °C) (Rectal)   Resp 16   Ht 5' 2.21\" (1.58 m)   Wt 141 lb 1.5 oz (64 kg)   SpO2 98%   BMI 25.64 kg/m²  No results for input(s): POCGLU in the last 72 hours. I/O last 3 completed shifts:  In: -   Out: 75 [Urine:75]  No intake/output data recorded. Neck: no adenopathy and supple, symmetrical, trachea midline  Lungs: clear to auscultation bilaterally  Heart: bradycardia  Abdomen: soft, non-tender; bowel sounds normal; no masses,  no organomegaly  Extremities: extremities normal, atraumatic, no cyanosis or edema  Neurologic: unresponsive    Data Review  CBC with Differential:  No results for input(s): WBC, RBC, HGB, HCT, PLT, MCV, MCH, MCHC, RDW, NRBC, SEGSPCT, BANDSPCT, BLASTSPCT, METASPCT, LYMPHOPCT, PROMYELOPCT, MONOPCT, EOSPCT, BASOPCT, MONOSABS, LYMPHSABS, EOSABS, BASOSABS, DIFFTYPE in the last 72 hours. Invalid input(s): MYELOPCT;3, ATYLMREL  CMP:  No results for input(s): NA, K, CL, CO2, BUN, CREATININE, GFRAA, AGRATIO, LABGLOM, GLUCOSE, PROT, LABALBU, CALCIUM, BILITOT, ALKPHOS, AST, ALT in the last 72 hours. Invalid input(s): GLU  PT/INR:  No results for input(s): PROTIME, INR in the last 72 hours. Meds:    sodium chloride flush  10 mL Intravenous 2 times per day     PRN Meds: sodium chloride flush, ondansetron, LORazepam, morphine    Assessment/Plan:   1. Cardiogenic shock.   2. Severe MRSA infection and epidural abscess involving thoracic spine and osteomyelitis T6-7. Family wants only comfort care and further interventions. Hospice to follow and manage end of life care.        Liz Bosch MD  4/1/2019 11:57 AM

## 2019-04-01 NOTE — CONSULTS
[address] [place] [address] Consult Note    Date: 4/1/2019  Name: Debi Post  MRN: [id number]   YOB: 1935   Patient's PCP: Dara Russo MD  Referring Physician: Dr. Ruba Cordon care admission date: 3/30/2019 and 3/11 to 3/27/2019 with discharge to [facility] and 1/15 to 1/21/19     Informant: Chart reviewed, discussed with Dr. Ana Christie and Dr Dara Russo, as well as hospice nurse liaison and the patient's nurse. The patient's  arrived during my examination, and I was able to talk to him with Dr Daar Russo. No history is able to be obtained from the patient due to her clinical status    CC: decreased responsiveness, low BP and heart rate    [place]: This is a 82 y.o. female re-admitted on 3/30/2019 with altered mental status, hypotension with Systolic blood pressure  In the 60's and bradycardia. The patient has a history of dementia, DM3 with neuropathy, nephropathy and retinopathy. The patient had a prolonged recent hospitalization with MRSA bacteremia, and was found to have a thoracic spine epidural abscess. There is a calloused area on the tip of the left long finger, that was debrided. The patient did not show any vegetations on Transesophageal echocardiogram. She had multiple consultants. Dr Dara Russo arranged transfer to a tertiary care hospital for neurosurgical consultation, and the patient's  and daughter declined that later. The patient was transferred to the [address] [place] [address] on 3/27/19, and the patient's spouse reports that she had a good couple of days, being sent back due to decreased alertness, hypotension and bradycardia. The patient did have a tagged white cell scan, showing uptake in the mid thoracic spine, thighs, and right shoulder. The patient had a nasogastric tube placed on 3/16/19, and is now on tube feeding, which she is tolerating. The patient's serum albumin is 2.6 gm/dl.         The patient is minimally responsive, but briefly tried to open her eyes. Hospice information and philosophy was provided during the last admission to the patient's spouse, however, he declined. We again discussed Hospice philosophy regarding care and comfort at the end of life. Questions were answered, and emotional support was provided. The patient's spouse wants to minimize the use of sedating medications, and we discussed that the patient has only had 2 doses of Morphine 1 mg IV, the last dose being over 8 hours ago. The patient does have some moaning, and we discussed comfort.       Past Medical History:   Diagnosis Date    [facility] 8/27/2014    Diabetes mellitus ([facility]     Essential hypertension, benign 9/5/2012    Hypertension     Other and unspecified hyperlipidemia 9/5/2012    Type I (juvenile type) diabetes mellitus without mention of complication, not stated as uncontrolled 9/5/2012       Past Surgical History:   Procedure Laterality Date    BREAST SURGERY  5/13/14    lumpectomy and sentinel node biopsy    FOOT SURGERY      HYSTERECTOMY         Social History     Socioeconomic History    Marital status:      Spouse name: Not on file    Number of children: Not on file    Years of education: Not on file    Highest education level: Not on file   Occupational History    Not on file   Social Needs    Financial resource strain: Not on file    Food insecurity:     Worry: Not on file     Inability: Not on file    Transportation needs:     Medical: Not on file     Non-medical: Not on file   Tobacco Use    Smoking status: Never Smoker    Smokeless tobacco: Never Used   Substance and Sexual Activity    Alcohol use: No    Drug use: No    Sexual activity: Not on file   Lifestyle    Physical activity:     Days per week: Not on file     Minutes per session: Not on file    Stress: Not on file   Relationships    Social connections:     Talks on phone: Not on file     Gets together: Not on file Attends Anabaptist service: Not on file     Active member of club or organization: Not on file     Attends meetings of clubs or organizations: Not on file     Relationship status: Not on file    Intimate partner violence:     Fear of current or ex partner: Not on file     Emotionally abused: Not on file     Physically abused: Not on file     Forced sexual activity: Not on file   Other Topics Concern    Not on file   Social History Narrative    Not on file       History reviewed. No pertinent family history. Allergies   Allergen Reactions    Pcn [Penicillins] Hives       Medication list reviewed  Prior to Admission medications    Medication Sig Start Date End Date Taking? Authorizing Provider   insulin lispro (HUMALOG) 100 UNIT/ML injection vial Inject 0-3 Units into the skin nightly 3/27/19   Rupert Allen MD   insulin lispro (HUMALOG) 100 UNIT/ML injection vial Inject 0-6 Units into the skin 3 times daily (with meals) 3/27/19   Rupert Allen MD   carvedilol (COREG) 6.25 MG tablet Take 1 tablet by mouth 2 times daily (with meals) 3/27/19   Rupert Allen MD   amLODIPine (NORVASC) 10 MG tablet Take 1 tablet by mouth daily 3/28/19   Rupert Allen MD   neomycin-bacitracin-polymyxin (NEOSPORIN) 400-5-5000 ointment Apply topically 2 times daily.  3/27/19 4/6/19  Rupert Allen MD   polyethylene glycol [facility]) packet Take 17 g by mouth daily 3/28/19 4/27/19  Rupert Allen MD   famotidine (PEPCID) 20 MG tablet Take 20 mg by mouth 2 times daily    Historical Provider, MD   sodium bicarbonate 650 MG tablet Take 1 tablet by mouth 2 times daily 10/18/18   Rupert Allen MD   aspirin 81 MG chewable tablet Take 1 tablet by mouth daily 8/30/18   Rupert Allen MD   clopidogrel (PLAVIX) 75 MG tablet Take 1 tablet by mouth daily 8/30/18   Rupert Allen MD       ROS: As noted in [address], all other systems are unobtainable due to the patient's clinical condition     Weight:    Wt Readings from Last 3 Encounters:   03/30/19 141 lb 1.5 oz (64 kg)   03/27/19 141 lb 1.5 oz (64 kg)   02/26/19 130 lb (59 kg)       Data reviewed 4/1/19:  MRI of thoracolumbar spine 3/26/19:  Thoracic spine:       Multifocal heterogeneous signal in the epidural space of the canal beginning   just below the T5-6 disc space extending down to the T10-11 disc space.  This   is consistent with an epidural abscess.  Throughout this level, there is   limitation of the cord signal evaluation due to artifact.  Subtle cord edema   would be difficult to exclude.  There is no definitive cord edema noted on   the axial images.  There is associated multilevel canal narrowing and cord   flattening.       Disproportionate edema is noted in the T6 and T7 vertebral bodies raising the   question of osteomyelitis.  Left paraspinal phlegmonous tissue is also   suggested.       Lumbar spine       multilevel degenerative disc disease in the lumbar spine as described.  No   finding to suggest discitis or osteomyelitis in the lumbar spine.  See above   for details of each level         Transesophageal echocardiogram 3/11/19:   Left ventricular systolic function is normal. EF 60%   There is no evidence of thrombus in the Left Atrial Appendage.   Normal aortic valve structure and function.   Mild mitral regurgitation is present.   Mild tricuspid regurgitation is present.   NO VEGETATION NOTED IN ANY VALVE    XRay hand  1. No evidence of osteomyelitis with attention to the third finger. 2. No fracture. CT chest and abdomen 3/13/19  1. Cardiomegaly with small bilateral pleural effusions and adjacent  atelectasis. 2. Extensive bilateral perihilar consolidations are seen, likely related to  pulmonary edema, atypical or multifocal pneumonia. 3. Esophageal reflux. 4. Left-sided pelvicaliectasis. No obstructing calculus is seen. Findings could be related to thickening of the bladder wall from cystitis. Follow-up  imaging with CT urogram can be obtained.      XR abdomen  NG tube tip projects over the body of the stomach. Transthoracic Echocardiogram 3/15/19   Left ventricular systolic function is normal.   Ejection fraction is visually estimated at 55-60%. Mild concentric left ventricular hypertrophy. No regional wall motion abnormalities were detected. Essentially normal chamber sizes. Moderate tricuspid regurgitation. RVSP is 46 mmHg. Moderate Pulmonary HTN. No evidence of any pericardial effusion. Mitral valve anterior leaflet cannot exclude a vegetation-mobile density   noted=if clinically indicated suggest a KENNETH     Tagged white cell scan  Two foci of asymmetric right facial activity are seen, concerning for  localized inflammation or infection. Correlate with physical examination. Some considerations include dental disease, cellulitis, abscess, or recent  trauma. Focal activity at the midthoracic spine, for which active degenerative  inflammation, discitis/osteomyelitis or paraspinal soft tissue infection are  considerations. MR could be performed for further assessment if patient is  focally symptomatic. Asymmetric activity at the right shoulder, which can reflect asymmetric  inflammatory change or low-grade joint infection.     Subtle punctate foci of localized inflammation or infection within the  proximal left thigh and distal right thigh; correlate with physical exam.       Hepatic Function Panel:    Lab Results   Component Value Date    ALKPHOS 133 03/21/2019    ALT <5 03/21/2019    AST 23 03/21/2019    PROT 5.3 03/21/2019    PROT 7.3 09/05/2012    BILITOT 0.2 03/21/2019    LABALBU 2.6 03/26/2019    LABALBU 73 08/28/2018     CBC with Differential:    Lab Results   Component Value Date    WBC 4.4 03/29/2019    RBC 2.88 03/29/2019    HGB 8.4 03/29/2019    HCT 27.0 03/29/2019     03/29/2019    MCV 93.8 03/29/2019    MCH 29.2 03/29/2019    MCHC 31.1 03/29/2019    RDW 13.5 03/29/2019    SEGSPCT 83.2 03/19/2019    BANDSPCT 20 03/13/2019    LYMPHOPCT 5.8 03/19/2019    MONOPCT 6.1 03/19/2019    BASOPCT 0.2 03/19/2019    MONOSABS 0.6 03/19/2019    LYMPHSABS 0.6 03/19/2019    EOSABS 0.3 03/19/2019    BASOSABS 0.0 03/19/2019    DIFFTYPE AUTOMATED DIFFERENTIAL 03/19/2019     CMP:    Lab Results   Component Value Date     03/29/2019    K 4.7 03/29/2019    CL 99 03/29/2019    CO2 25 03/29/2019    BUN 44 03/29/2019    CREATININE 2.7 03/29/2019    GFRAA 20 03/29/2019    LABGLOM 17 03/29/2019    GLUCOSE 426 03/29/2019    PROT 5.3 03/21/2019    PROT 7.3 09/05/2012    LABALBU 2.6 03/26/2019    LABALBU 73 08/28/2018    CALCIUM 9.8 03/29/2019    BILITOT 0.2 03/21/2019    ALKPHOS 133 03/21/2019    AST 23 03/21/2019    ALT <5 03/21/2019       Physical Exam:   BP (!) 125/53   Pulse (!) 42   Temp (!) 90.9 °F (32.7 °C) (Rectal)   Resp 18   Ht 5' 2.21\" (1.58 m)   Wt 141 lb 1.5 oz (64 kg)   SpO2 97%   BMI 25.64 kg/m²   General: briefly opened eyes with my exam, occasional moaning  HEENT: Mucous membranes are dry, sclerae are clear, mild conjunctival pallor    Heart: distant tones, slow IRRR, S1S2, no murmurs  Lungs:  Distant breath sounds bilaterally, diminished at the bases, without rales, scattered rhonchi   Back: not examined  Abdomen: soft, bowel sounds quietly present, no apparent tenderness, nondistended  Extremities:  No mottling, + edema, there is a calloused area on the tip of the left long finger, which does not appear grossly infected  Neurologic: obtunded. Assessment/Plan:  1. Thoracic spinal epidural abscess with MRSA sepsis, hypotension and bradycardia. The patient is eligible for General Inpatient Hospice, for management of pain, restlessness. Discussed with spouse, who does not want to decide now, and the hospice nurse liaison can follow up. 2. Encephalopathy with underlying demenia. 3. Malnutrition  4. 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 Hypertension  6. DM with neuropathy, retinopathy, nephropathy  7.  DNR-comfort care      Patient Active Problem List   Diagnosis Code    TIA (transient ischemic attack) G45.9    Essential hypertension, benign I10    Other and unspecified hyperlipidemia E78.5    Type 1 diabetes mellitus (HCC) E10.9    Breast cancer ([facility] C50.919    Encephalopathy acute G93.40    Chronic kidney disease (CKD) stage G3a/A3, moderately decreased glomerular filtration rate (GFR) between 45-59 mL/min/1.73 square meter and albuminuria creatinine ratio greater than 300 mg/g ([facility]) N18.3    Hypercalcemia E83.52    DM (diabetes mellitus) ([facility]) E11.9    Acute encephalopathy N50.93    Metabolic encephalopathy J86.69    Urinary tract infection, site not specified N39.0    Pneumonia J18.9    MRSA bacteremia R78.81    Sepsis due to methicillin resistant Staphylococcus aureus (MRSA) ([facility]) A41.02    [facility] (acute kidney injury) ([facility] N17.9    WD-Open wound of left middle finger without damage to nail S61.203A    WD-Nontraumatic hematoma of skin and subcutaneous tissue M79.81    Complete heart block (HCC) I44.2    Cardiogenic shock ([facility]) R57.0    Altered mental status I37.40     Certification of Terminal Illness: I certify that this patient is eligible for Hospice services for a terminal diagnosis of Thoracic spinal epidural abscess with MRSA sepsis with a life expectancy predicted to be less than 6 months if this illness follows its expected course.     Kamala Chavez MD, [facility]

## 2019-04-01 NOTE — PLAN OF CARE
Problem: Coping:  Goal: Ability to participate in care decisions during the dying process will improve  Description  Ability to participate in care decisions during the dying process will improve  Outcome: Ongoing  Goal: Family's ability to cope with current situation will improve  Description  Family's ability to cope with current situation will improve  Outcome: Ongoing     Problem: Health Behavior:  Goal: Identification of resources available to assist in meeting health care needs will improve  Description  Identification of resources available to assist in meeting health care needs will improve  Outcome: Ongoing     Problem: Physical Regulation:  Goal: Complications related to the disease process, condition or treatment will be avoided or minimized  Description  Complications related to the disease process, condition or treatment will be avoided or minimized  Outcome: Ongoing     Problem: Respiratory:  Goal: Verbalizations of increased ease of respirations will increase  Description  Verbalizations of increased ease of respirations will increase  Outcome: Ongoing     Problem: Role Relationship:  Goal: The number of positive interactions the caregiver has with the patient will increase  Description  The number of positive interactions the caregiver has with the patient will increase  Outcome: Ongoing     Problem: Sensory:  Goal: Expressions of feelings of enhanced comfort will increase  Description  Expressions of feelings of enhanced comfort will increase  Outcome: Ongoing     Problem: Skin Integrity:  Goal: Risk for impaired skin integrity will decrease  Description  Risk for impaired skin integrity will decrease  Outcome: Ongoing     Problem: Falls - Risk of:  Goal: Will remain free from falls  Description  Will remain free from falls  Outcome: Ongoing  Goal: Absence of physical injury  Description  Absence of physical injury  Outcome: Ongoing     Problem: Risk for Impaired Skin Integrity  Goal: Tissue integrity - skin and mucous membranes  Description  Structural intactness and normal physiological function of skin and  mucous membranes.   Outcome: Ongoing     Problem: Pain:  Goal: Pain level will decrease  Description  Pain level will decrease  Outcome: Ongoing  Goal: Control of acute pain  Description  Control of acute pain  Outcome: Ongoing  Goal: Control of chronic pain  Description  Control of chronic pain  Outcome: Ongoing

## 2019-04-01 NOTE — CARE COORDINATION
TC to complete pt's referral to [address] [place] [address].      Enma Sahu, Social Work Student

## 2019-04-02 NOTE — FLOWSHEET NOTE
Date:4/2/2019  Name: Radha Umanzor (50 years old: At 07:10: Patient Unresponsive  08:12: Death Announce     At 7:10, Pulse: N/A, BP:78/44, MAP 58, R 18  Abdomen: Bowel sounds prersent  Lungs: Apnea  Patient was unresponsive, and I was listening to his pulse that I could not get on radial. I start listening to his heart (Apical pulse) that I still did not hear. I put the bed to the bed down and I went to call the nurse(Amber). Al Maryotilia came with medication (Morphine, I think because it was the one that was ordered for the patient). When she came in, She look at her, and checked, and told me that the patient is not breathing, she passed. Then she called other nurses and they confirmed death at 08:12.       [facility]  Student Nurse/[facility]  4/2/2019 at 9:42

## 2019-04-02 NOTE — DISCHARGE SUMMARY
[facility]    Death Summary    Date: 4/2/2019  Name: Conchis Franco  MRN: [id number]  YOB: 1935     Patient's PCP: Alvin Weiss MD  Admit Date: 4/1/2019 to [address] [place] [address]  Date of Death: 4/2/2019  Time: 0269  Acute care admission date: 3/30 to 4/1/2019 and 3/11 to 3/27/2019 with discharge to [facility] and 1/15 to 1/21/19   Admitting Physician: Angeline Cohen MD to [address] [place] [address]  Discharge Physician: Susan Adams MD  Consultation: none during General Inpatient Hospice stay    Invasive procedures: none during General Inpatient Hospice stay    Discharge Diagnoses:  1. Thoracic spinal epidural abscess with MRSA sepsis, hypotension and bradycardia   2. Encephalopathy with underlying demenia. 3. Malnutrition  4. 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 Hypertension  6.  DM with neuropathy, retinopathy, nephropathy      Patient Active Problem List   Diagnosis Code    TIA (transient ischemic attack) G45.9    Essential hypertension, benign I10    Other and unspecified hyperlipidemia E78.5    Type 1 diabetes mellitus ([address] E10.9    Breast cancer ([facility] C50.919    Encephalopathy acute G93.40    Chronic kidney disease (CKD) stage G3a/A3, moderately decreased glomerular filtration rate (GFR) between 45-59 mL/min/1.73 square meter and albuminuria creatinine ratio greater than 300 mg/g ([facility]) N18.3    Hypercalcemia E83.52    DM (diabetes mellitus) ([facility]) E11.9    Acute encephalopathy R28.45    Metabolic encephalopathy R32.89    Urinary tract infection, site not specified N39.0    Pneumonia J18.9    MRSA bacteremia R78.81    Sepsis due to methicillin resistant Staphylococcus aureus (MRSA) ([facility]) A41.02    NEETA (acute kidney injury) ([facility] N17.9    WD-Open wound of left middle finger without damage to nail S61.203A    WD-Nontraumatic hematoma of skin and subcutaneous tissue M79.81    Complete heart block (HCC) I44.2    Cardiogenic shock ([facility]) R57.0    Altered mental status R41.82    Abscess in epidural space of thoracic spine G06.1       Brief History, reason for admission: Please see the acute care H+P, discharge summary, consultants notes, and my notes. The patient was admitted to [address] [place] [address]. This is a 82 y.o. female re-admitted on 3/30/2019 with altered mental status, hypotension with Systolic blood pressure  In the 60's and bradycardia. The patient has a history of dementia, DM3 with neuropathy, nephropathy and retinopathy. The patient had a prolonged recent hospitalization with MRSA bacteremia, and was found to have a thoracic spine epidural abscess. There is a calloused area on the tip of the left long finger, that was debrided. The patient did not show any vegetations on Transesophageal echocardiogram. She had multiple consultants. Dr Nikole Wilson arranged transfer to a tertiary care hospital for neurosurgical consultation, and the patient's  and daughter declined that later. The patient was transferred to the [address] [place] [address] on 3/27/19, and the patient's spouse reports that she had a good couple of days, being sent back due to decreased alertness, hypotension and bradycardia.         The patient did have a tagged white cell scan, showing uptake in the mid thoracic spine, thighs, and right shoulder. The patient had a nasogastric tube placed on 3/16/19, and is now on tube feeding, which she is tolerating. The patient's serum albumin is 2.6 gm/dl.         The patient is minimally responsive, but briefly tried to open her eyes. Hospital Course: The patient was admitted to [facility] with the above, for complete details, please see the acute care History and Physical, progress notes, consultant notes and discharge summary. The patient was treated with comfort medications, and symptoms were managed. Emotional and spiritual support was provided to the patient and family.  The patient  as noted above.     Cause of death: Thoracic spinal epidural abscess with MRSA sepsis      Significant Diagnostic Studies:  See computerized record in Natacha Wilburn MD, [facility]   4/2/2019

## 2019-04-02 NOTE — PROGRESS NOTES
[facility] Inpatient Hospice Progress Note    Date: 4/2/2019  Name: Carly Vega  MRN: [id number]  YOB: 1935   Patient's PCP: Eber Gaffney MD  Admit Date: 4/1/2019 to [facility]  Acute care admission date: 3/30 to 4/1/2019 and 3/11 to 3/27/2019 with discharge to [facility] and 1/15 to 1/21/19     Subjective: The patient is comfortable, unresponsive with shallow respirations and periods of apnea. No family are here. Objective:   Pain is managed with prn Morphine with only one dose given, Glycopyrrolate IV is available for secretions, and Lorazepam is available for anxiety. Physical Exam:   BP (!) 85/49   Pulse (!) 45   Resp 24   SpO2 93%   General: Comfortable, unresponsive  HEENT: Mucous membranes are dry   Heart: distant tones, slow IRRR, S1S2, no murmurs  Lungs:  Distant, shallow breath sounds bilaterally with periods of apnea, diminished at the bases, without rales, scattered rhonchi   Abdomen: soft, bowel sounds quietly present, no tenderness, nondistended  Extremities:  + mottling, no edema  Neurologic: unresponsive    Assessment/Plan:     1. Thoracic spinal epidural abscess with MRSA sepsis, hypotension and bradycardia approaching end of life. [address] for management of pain, restlessness, and end of life care. Discussed with the nurse, and spouse is notified. 2. Encephalopathy with underlying demenia. 3. Malnutrition  4. 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 Hypertension  6.  DM with neuropathy, retinopathy, nephropathy      Patient Active Problem List   Diagnosis Code    TIA (transient ischemic attack) G45.9    Essential hypertension, benign I10    Other and unspecified hyperlipidemia E78.5    Type 1 diabetes mellitus ([address] E10.9    Breast cancer ([facility] C50.919    Encephalopathy acute G93.40    Chronic kidney disease (CKD) stage G3a/A3, moderately decreased glomerular filtration rate (GFR) between 45-59 mL/min/1.73 square meter and albuminuria creatinine ratio greater than 300 mg/g ([facility]) N18.3    Hypercalcemia E83.52    DM (diabetes mellitus) ([facility]) E11.9    Acute encephalopathy L96.86    Metabolic encephalopathy V79.55    Urinary tract infection, site not specified N39.0    Pneumonia J18.9    MRSA bacteremia R78.81    Sepsis due to methicillin resistant Staphylococcus aureus (MRSA) ([facility]) A41.02    NEETA (acute kidney injury) ([facility] N17.9    WD-Open wound of left middle finger without damage to nail S61.203A    WD-Nontraumatic hematoma of skin and subcutaneous tissue M79.81    Complete heart block (HCC) I44.2    Cardiogenic shock ([facility]) R57.0    Altered mental status R41.82    Abscess in epidural space of thoracic spine G06.1       SHERYL Cline MD, [facility]  4/2/2019

## 2019-04-04 NOTE — DISCHARGE SUMMARY
[address]  Discharge Summary     Patient ID  Cash Weir   1935  [id number]          Admit date: 3/30/2019. Discharge date: 04/01/2019. Admitting Physician: Marica Schilder, MD.   Discharge Physician: Gilbert Gan MD    Discharge Diagnoses:     1. Thoracic spinal epidural abscess with MRSA sepsis, cardiogenic shock, hypotension and bradycardia   2. Encephalopathy with underlying dementia. 3. Malnutrition  4. 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 Hypertension  6. DM with neuropathy, retinopathy, nephropathy      Discharged Condition: critical    Hospital Course: The patient is a 82 y.o. female hx of recent extensive medical care w last admission 3/11-3/27 discharged to SNF earlier this week after MRSA pneumonia and sepsis, also thoracic spinal epidural abscess. Was very weak and debilitated at SNF but conversive and in no acute distress w her family till suddenly ~11am became lethargic and weak then unresponsive. Taken to ED, found to be in cardiogenic shock w hr ~30 and hypotensive to 60s. Has not had prior hx of cardiac dz per pt's family. Importantly, given her extensive illness and infection fr last admission family reiterated her DNR CC status and requested no heroic measures, no life support but was interested in IVF and possible transition to hospice. Pt was admitted to med floor. She received comfort care. Discussed with pt's . He requested only comfort care. Pt was eval by Dr Fredo Hunter from hospice team.  Pt was transitioned to Inpatient hospice care. Consults:    Cardiology  Hospice. Significant Diagnostic Studies:   Ct Abdomen Pelvis Wo Contrast Additional Contrast? None    Result Date: 3/14/2019  EXAMINATION: CT OF THE CHEST WITHOUT CONTRAST; CT OF THE ABDOMEN AND PELVIS WITHOUT CONTRAST 3/13/2019 10:57 pm; 3/13/2019 10:55 pm TECHNIQUE: CT of the chest was performed without the administration of intravenous contrast. Multiplanar reformatted images are provided for review.  Dose Peritoneum/Retroperitoneum: There is no free air or lymphadenopathy. Bones/Soft Tissues: No destructive osseous lesion is identified. 1. Cardiomegaly with small bilateral pleural effusions and adjacent atelectasis. 2. Extensive bilateral perihilar consolidations are seen, likely related to pulmonary edema, atypical or multifocal pneumonia. 3. Esophageal reflux. 4. Left-sided pelvicaliectasis. No obstructing calculus is seen. Findings could be related to thickening of the bladder wall from cystitis. Follow-up imaging with CT urogram can be obtained. Xr Lumbar Spine (2-3 Views)    Result Date: 3/12/2019  EXAMINATION: 3 XRAY VIEWS OF THE LUMBAR SPINE 3/11/2019 10:40 pm COMPARISON: None. HISTORY: ORDERING SYSTEM PROVIDED HISTORY: left lower back pain TECHNOLOGIST PROVIDED HISTORY: Reason for exam:->left lower back pain Ordering Physician Provided Reason for Exam: left lower back pain Acuity: Acute Type of Exam: Initial Additional signs and symptoms: na Relevant Medical/Surgical History: type 1 diabetes, breast cancer FINDINGS: Bones: Five non-rib-bearing lumbar vertebral bodies are present. 5 mm anterolisthesis of L4 on L5. Vertebral body heights are maintained. Degenerative changes: Multilevel degenerative disc disease characterized by disc height loss with endplate sclerosis and osteophyte production, most pronounced at L4-L5 and L5-S1. Lower lumbar facet arthrosis. Soft Tissues: Aortoiliac atherosclerotic calcifications. A catheter/tubing projects over the pelvis. Advanced lumbar spondylosis and facet arthrosis at L4-S1 with grade 1 degenerative anterolisthesis of L4 on L5. Xr Foot Right (min 3 Views)    Result Date: 3/14/2019  EXAMINATION: 3 XRAY VIEWS OF THE RIGHT FOOT 3/14/2019 1:24 pm COMPARISON: None.  HISTORY: ORDERING SYSTEM PROVIDED HISTORY: right plantar callus TECHNOLOGIST PROVIDED HISTORY: Reason for exam:->right plantar callus Ordering Physician Provided Reason for Exam: right plantar callus between 3rd and 4th metatarsal Acuity: Unknown Type of Exam: Initial FINDINGS: The bones are osteopenic. Degenerative changes are noted in the midfoot. There is a Lisfranc injury with lateral subluxation of the 2nd through 5th metatarsals. Vascular calcifications are noted. Degenerative changes are noted in the midfoot. 1. Lisfranc injury with lateral subluxation of the 2nd through 5th metatarsals. 2. Degenerative changes are noted in the midfoot. 3. Osteopenia. Ct Head Wo Contrast    Result Date: 3/11/2019  EXAMINATION: CT OF THE HEAD WITHOUT CONTRAST  3/11/2019 12:12 pm TECHNIQUE: CT of the head was performed without the administration of intravenous contrast. Dose modulation, iterative reconstruction, and/or weight based adjustment of the mA/kV was utilized to reduce the radiation dose to as low as reasonably achievable. COMPARISON: 01/16/2019 HISTORY: ORDERING SYSTEM PROVIDED HISTORY: CONFUSION/DELIRIUM, ALTERED LOC, UNEXPLAINED TECHNOLOGIST PROVIDED HISTORY: Has a \"code stroke\" or \"stroke alert\" been called? ->No Ordering Physician Provided Reason for Exam: Confusion/delirium, altered loc, unexplained Acuity: Unknown Type of Exam: Unknown Additional signs and symptoms: pt awake but not responding to questions Relevant Medical/Surgical History: none FINDINGS: BRAIN/VENTRICLES: Motion artifact degrades image quality. There is no acute intracerebral hemorrhage or extra-axial fluid collection. There is mild cerebral atrophy with mild periventricular white matter small vessel ischemic disease. An old lacunar infarct involves the right thalamus. 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 SINUSES: There is partial opacification of the left mastoid air cells. SOFT TISSUES/SKULL:  The calvarium is intact. 1. No acute intracranial abnormality.      Ct Chest Wo Contrast    Result Date: 3/25/2019  EXAMINATION: CT OF THE CHEST WITHOUT CONTRAST 3/25/2019 12:56 pm TECHNIQUE: CT of as reasonably achievable.; CT of the abdomen and pelvis was performed without the administration of intravenous contrast. Multiplanar reformatted images are provided for review. Dose modulation, iterative reconstruction, and/or weight based adjustment of the mA/kV was utilized to reduce the radiation dose to as low as reasonably achievable. COMPARISON: None HISTORY: ORDERING SYSTEM PROVIDED HISTORY: pneumonia TECHNOLOGIST PROVIDED HISTORY: Ordering Physician Provided Reason for Exam: pneumonia Acuity: Acute Type of Exam: Initial Additional signs and symptoms: no Relevant Medical/Surgical History: none; ORDERING SYSTEM PROVIDED HISTORY: left flank pain TECHNOLOGIST PROVIDED HISTORY: Additional Contrast?->None FINDINGS: Chest: Mediastinum: The heart is mildly enlarged. Coronary artery calcifications are seen. There is no pericardial effusion. Calcified hilar lymph nodes are seen from prior granulomatous disease. Fluid is seen within the esophagus suggestive for reflux. Lungs/pleura: There are small bilateral pleural effusions with adjacent atelectasis. Extensive bilateral perihilar consolidations are seen. Findings could be related to multifocal or atypical pneumonia, pulmonary edema, or aspiration. Soft Tissues/Bones: No destructive osseous lesions are identified. Abdomen/Pelvis: Organs: Unenhanced images of the liver, gallbladder, spleen, adrenal glands, pancreas, and right kidney are grossly unremarkable. There is mild left hydronephrosis and moderate hydroureter. There is no evidence of an obstructing calculus. GI/Bowel: There is no evidence of bowel obstruction. The appendix is normal. Pelvis: A Jay catheter is seen within the bladder. A rectal tube is noted coiled within the rectum. There is no free fluid. The bladder wall appears to be mildly thickened. Peritoneum/Retroperitoneum: There is no free air or lymphadenopathy. Bones/Soft Tissues: No destructive osseous lesion is identified.      1. is noted throughout the lumbar spine. There is no posterior soft tissue fluid collection. Small retroperitoneal lymph nodes are noted without enlargement. There is no retroperitoneal or sella small so mass or fluid collection. L1-L2: Mild disc bulging is noted diffusely. Facet hypertrophic changes are noted L2-L3: Minimal disc bulging is noted diffusely. This is greatest in the proximal foraminal regions. Facet and ligament hypertrophic changes are noted L3-L4: Diffuse disc bulging is noted with annular tear. Mild to moderate foraminal narrowing is noted bilaterally. Moderate central canal narrowing is noted. Facet and ligament hypertrophic changes are noted L4-L5: Diffuse disc bulging is noted. Facet and ligament hypertrophic changes are noted. Severe narrowing of the canal is noted. Severe bilateral foraminal narrowing is noted, right greater than left. There is a right foraminal protrusion. L5-S1: Mild disc bulging is noted along with diffuse endplate hypertrophic change. Annular tear is noted. Moderate to severe right and moderate left foraminal narrowing is noted. Disc bulging abuts the proximal S1 nerve roots with mild posterior displacement. Mild narrowing of the canal is noted. MRI thoracic spine: Counting is difficult. The sagittal  images are limited at the craniocervical junction. There is high T2 and low T1 signal in the T6 and T7 vertebral bodies. Degenerative endplate signal changes are greatest in the T8 and T9 vertebral bodies and at the T10-11 level. Cord: Cord detail is limited between T6 and T8. On the sagittal STIR images, there is questionable increased signal in the cord at this level. There is ill definition of the cord on sagittal STIR image 6 and sagittal T2 image 6. Multifocal heterogeneous epidural signal is noted extending from just below the T5-T6 disc inferiorly to the T10-11 level.   This is largest posterior to the T9 and T10 vertebral bodies with maximum thickness of approximately 5.4 mm. There is associated ventral cord compression and canal narrowing. Mild multilevel disc bulging is noted along with multilevel endplate spurring. Disc bulging is greatest at T10-11. There is probable bilateral proximal foraminal narrowing at T10-11. Multilevel facet hypertrophic changes are noted Bilateral pleural effusions are noted. There is paraspinal edema suggested on the left at T6-7. Alphia Pierini Thoracic spine: Multifocal heterogeneous signal in the epidural space of the canal beginning just below the T5-6 disc space extending down to the T10-11 disc space. This is consistent with an epidural abscess. Throughout this level, there is limitation of the cord signal evaluation due to artifact. Subtle cord edema would be difficult to exclude. There is no definitive cord edema noted on the axial images. There is associated multilevel canal narrowing and cord flattening. Disproportionate edema is noted in the T6 and T7 vertebral bodies raising the question of osteomyelitis. Left paraspinal phlegmonous tissue is also suggested. Lumbar spine multilevel degenerative disc disease in the lumbar spine as described. No finding to suggest discitis or osteomyelitis in the lumbar spine. See above for details of each level Note: Multiple attempts were made to contact the patient's ordering physician however were unsuccessful. In speaking with the charge nurse, [facility], she stated at in [facility] the ordering physician does not take urgent phone calls and  a non emergent message should be sent to him and that he would respond when he gets back. I instructed Gemma to relay the message of an epidural abscess to the patient's nurse and subsequently pass this information on to the referring physician. This conversation was at 6:45 p.m.      Mri Lumbar Spine Wo Contrast    Result Date: 3/26/2019  EXAMINATION: MRI OF THE LUMBAR SPINE WITHOUT CONTRAST; MRI OF THE THORACIC SPINE WITHOUT CONTRAST, 3/26/2019 4:10 pm TECHNIQUE: Multiplanar multisequence MRI of the lumbar spine was performed without the administration of intravenous contrast.; Multiplanar multisequence MRI of the thoracic spine was performed without the administration of intravenous contrast. COMPARISON: None. HISTORY: ORDERING SYSTEM PROVIDED HISTORY: verterbral osteomyelitis-unable to give contrast gfr 23 TECHNOLOGIST PROVIDED HISTORY: Ordering Physician Provided Reason for Exam: back pain hx ca eval osteo / mets gfr 23 no contrast given Acuity: Unknown Type of Exam: Ongoing; ORDERING SYSTEM PROVIDED HISTORY: vertebral osteomyelitis-unable to give contrast gfr 23 TECHNOLOGIST PROVIDED HISTORY: Ordering Physician Provided Reason for Exam: back pain hx ca eval osteo / mets gfr 23 no contrast given Acuity: Unknown Type of Exam: Unknown FINDINGS: MRI lumbar spine: BONES/ALIGNMENT: Minimal L5 retrolisthesis is noted. There is no acute fracture. There is no focal bone edema. SPINAL CORD: Conus terminates at L1-L2 and is normal in appearance. Detail is slightly limited due to artifact. SOFT TISSUES: Multilevel disc desiccation is noted. Heterogeneous this signal is greatest at T10-11 and L4-5. There is minimal increased signal in the soft tissues along the lateral margins of L4-5 bilaterally. There is no fluid collection. Mild multifocal posterior paraspinal muscular edema is noted throughout the lumbar spine. There is no posterior soft tissue fluid collection. Small retroperitoneal lymph nodes are noted without enlargement. There is no retroperitoneal or sella small so mass or fluid collection. L1-L2: Mild disc bulging is noted diffusely. Facet hypertrophic changes are noted L2-L3: Minimal disc bulging is noted diffusely. This is greatest in the proximal foraminal regions. Facet and ligament hypertrophic changes are noted L3-L4: Diffuse disc bulging is noted with annular tear. Mild to moderate foraminal narrowing is noted bilaterally. T10-11 disc space. This is consistent with an epidural abscess. Throughout this level, there is limitation of the cord signal evaluation due to artifact. Subtle cord edema would be difficult to exclude. There is no definitive cord edema noted on the axial images. There is associated multilevel canal narrowing and cord flattening. Disproportionate edema is noted in the T6 and T7 vertebral bodies raising the question of osteomyelitis. Left paraspinal phlegmonous tissue is also suggested. Lumbar spine multilevel degenerative disc disease in the lumbar spine as described. No finding to suggest discitis or osteomyelitis in the lumbar spine. See above for details of each level Note: Multiple attempts were made to contact the patient's ordering physician however were unsuccessful. In speaking with the charge nurse, Alice Harden, she stated at in [facility] the ordering physician does not take urgent phone calls and  a non emergent message should be sent to him and that he would respond when he gets back. I instructed Gemma to relay the message of an epidural abscess to the patient's nurse and subsequently pass this information on to the referring physician. This conversation was at 6:45 p.m. Xr Finger Left (min 2 Views)    Result Date: 3/14/2019  EXAMINATION: 3 XRAY VIEWS OF THE LEFT FINGERS 3/14/2019 1:24 pm COMPARISON: None HISTORY: ORDERING SYSTEM PROVIDED HISTORY: left middle finger tip ulcer, osteomyelitis TECHNOLOGIST PROVIDED HISTORY: Reason for exam:->left middle finger tip ulcer, osteomyelitis Ordering Physician Provided Reason for Exam: left middle finger tip ulcer, osteomyelitis Acuity: Unknown Type of Exam: Initial FINDINGS: There is normal alignment of the left hand. There is no fracture or osseous destructive lesion. No bone erosion or periosteal reaction. Vascular calcifications are noted.  Degenerative changes are noted along the radiocarpal joint, interphalangeal joints, and first carpometacarpal joint. Vascular calcifications are noted. 1. No evidence of osteomyelitis with attention to the third finger. 2. No fracture. Nm Inflammatory Wbc Limited W Indium 111    Result Date: 3/15/2019  EXAMINATION: NUCLEAR MEDICINE WHITE BLOOD CELL IMAGING 3/14/2019 TECHNIQUE: Following the injection of autologous leuokocytes labeled with 499 uCi of indium 111 tagged white blood cells, routine scintigraphy of the whole body was performed. Spot images were also obtained. COMPARISON: CT chest dated 03/13/2019 HISTORY: ORDERING SYSTEM PROVIDED HISTORY: TECHNOLOGIST PROVIDED HISTORY: Ordering Physician Provided Reason for Exam: MRSA; unclear source Acuity: Acute Type of Exam: Ongoing Additional signs and symptoms: MRSA; unclear source Relevant Medical/Surgical History: MRSA; unclear source FINDINGS: Physiologic activity is seen within spleen greater than liver. Two foci of asymmetric moderate activity are seen in the right facial region anteriorly. Increased activity is seen at the midthoracic spine to the left of midline on the posterior view. Asymmetric activity is seen at the right shoulder. Skeletal activity is otherwise relatively symmetric. Diffuse activity within bilateral lungs likely reflects those pulmonary infiltrates seen on recent CT. Two foci of asymmetric right facial activity are seen, concerning for localized inflammation or infection. Correlate with physical examination. Some considerations include dental disease, cellulitis, abscess, or recent trauma. Focal activity at the midthoracic spine, for which active degenerative inflammation, discitis/osteomyelitis or paraspinal soft tissue infection are considerations. MR could be performed for further assessment if patient is focally symptomatic. Asymmetric activity at the right shoulder, which can reflect asymmetric inflammatory change or low-grade joint infection.  Subtle punctate foci of localized inflammation or infection within the proximal 3/11/2019  EXAMINATION: SINGLE XRAY VIEW OF THE CHEST 3/11/2019 11:14 am COMPARISON: 01/15/2019 HISTORY: ORDERING SYSTEM PROVIDED HISTORY: ams TECHNOLOGIST PROVIDED HISTORY: Reason for exam:->ams Ordering Physician Provided Reason for Exam: ams Acuity: Unknown Type of Exam: Unknown FINDINGS: The lung volumes are low. Bilateral perihilar infiltrates are identified. These changes may represent edema versus multifocal pneumonia. There is elevation of the right hemidiaphragm. The cardiomediastinal silhouette is without acute process. There is no evidence of pneumothorax. The osseous structures are without acute process. Bilateral perihilar infiltrates may represent edema versus multifocal pneumonia. Stable elevation of the right hemidiaphragm. Ir Guided Thoracentesis Pleural    Result Date: 3/26/2019  PROCEDURE: ULTRASOUNDGUIDED left THORACENTESIS 3/26/2019 HISTORY: ORDERING SYSTEM PROVIDED HISTORY: bilateral pleural effusion TECHNOLOGIST PROVIDED HISTORY: Reason for exam:->bilateral pleural effusion TECHNIQUE: Informed consent was obtained after a detailed explanation of the procedure including risks, benefits, and alternatives. Universal protocol was performed. The left chest was prepped and draped in sterile fashion and local anesthesia was achieved with lidocaine. Note is made that the right pleural space was interrogated ultrasonographically and does not demonstrate any significant amount of fluid at this time. An 8 Kazakh needle sheath was advanced under ultrasound guidance into pleural effusion and thoracentesis was performed. The patient tolerated the procedure well. FINDINGS: A total of 200 of clear to slightly yellow serous fluid was removed from the left pleural space. Successful ultrasound guided thoracentesis. -    No results found for this or any previous visit (from the past 24 hour(s)). Disposition: Inpatient Hospice.        Signed: Maylin Whittington    Time spent on discharge 35 minutes